# Patient Record
Sex: FEMALE | Race: WHITE | NOT HISPANIC OR LATINO | Employment: FULL TIME | URBAN - METROPOLITAN AREA
[De-identification: names, ages, dates, MRNs, and addresses within clinical notes are randomized per-mention and may not be internally consistent; named-entity substitution may affect disease eponyms.]

---
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
## 2021-05-13 NOTE — PROGRESS NOTES
Progress Note      Patient Name: Lesly Bonilla   Patient ID: [id number]   Sex: Female   YOB: 2000    Primary Care Provider: Yunior Gonsalez DO   Referring Provider: Yunior Gonsalez DO    Visit Date: August 11, 2020    Provider: Yunior Gonsalez DO   Location: [facility]   Location Address: [address] [place] [address], [address]  [place], [place]  [id number]   Location Phone: [phone]          History Of Present Illness  Lesly Bonilla is a 19 year old /White female who presents for evaluation and treatment of:      Pt is here for possible pink eye.    Pt states that it started Saturday with some discharge and feel like something was in her eye. Pt states that she woke up on Saturday with both eyes swollen shut.    Pt states that she has been regular eye drop R[facility] drops.       Allergy List  Allergen Name Date Reaction Notes   NO KNOWN DRUG ALLERGIES --  --  --        Allergies Reconciled  Social History  Finding Status Start/Stop Quantity Notes   Alcohol Never --/-- --  05/06/2020 -    Exercises daily --  --/-- --  --    Single --  --/-- --  --    Tobacco Never --/-- --  --          Review of Systems  · Constitutional  o Denies  o : fatigue, night sweats  · Eyes  o Admits  o : eye discomfort, eye pain, blurred vision, dry eyes  o Denies  o : double vision  · HENT  o Denies  o : vertigo, recent head injury  · Cardiovascular  o Denies  o : chest pain, irregular heart beats  · Respiratory  o Denies  o : shortness of breath, productive cough  · Gastrointestinal  o Denies  o : nausea, vomiting  · Genitourinary  o Denies  o : dysuria, urinary retention  · Integument  o Denies  o : hair growth change, new skin lesions  · Neurologic  o Denies  o : altered mental status, seizures  · Musculoskeletal  o Denies  o : joint swelling, limitation of motion  · Endocrine  o Denies  o : cold intolerance, heat intolerance  · Heme-Lymph  o Denies  o : petechiae, lymph node enlargement or  "tenderness  · Allergic-Immunologic  o Denies  o : frequent illnesses      Vitals  Date Time BP Position Site L\R Cuff Size HR RR TEMP (F) WT  HT  BMI kg/m2 BSA m2 O2 Sat HC       05/06/2020 08:30 /65 Sitting    84 - R   194lbs 16oz 4'  10\" 40.75 1.9 98 %    08/11/2020 03:58 /71 Sitting    107 - R   192lbs 4oz 4'  10\" 40.18 1.89 98 %          Physical Examination  · Constitutional  o Appearance  o : alert, oriented, in no acute distress, well developed, well-nourished, obese  · Eyes  o Vision  o : Conjuntivae:injected, Sclerae injected, Pupils: PERRL, Cornea: Clear, no lesions bilateral, no obvious abrasions despite significant injection of the eyes  · Ears, Nose, Mouth and Throat  o Ears  o : Ext. Ears: Normal shape, Non tender, EACs: Normal , Tragus intact bilaterally, Hearing: intact to conversational voice bilaterally  o Nose  o : No nasal discharge, Mucosa: normal, Septum: midline, Sinuses: Nontender  o Throat  o : Oropharynx: no inflmation or lesions, no purulence or drainage  · Neck  o Inspection/Palpation  o : Supple, no masses or tenderness, no deformities, Trachea: Midline, ROM: with in normal limits, no neck stiffness, no lymphadenopathy  o Thyroid  o : no thyomegaly, no palpabale masses   · Respiratory  o Auscultation of Lungs  o : normal breath sounds throughout, no wheeze, rhonchi, or crackles  · Cardiovascular  o Heart  o : Regular rate and rhythm, Normal S1,S2 , No cardiac murmers, No S3 or S4 gallop or rubs  · Gastrointestinal  o Abdominal Examination  o : abdomen soft, nontender, non distended, no rigidity, gaurding, rebound tenderness, no ventral hernias present  o Liver and spleen  o : no hepatomegaly present, liver nontender to palpation, spleen not palpable  · Skin and Subcutaneous Tissue  o General Inspection  o : no rashes on visible skin, normal skin color, warm and dry  o Digits and Nails  o : no clubbing, cyanosis, deformities or edema present, normal appearing " nails  · Neurologic  o Mental Status Examination  o : alert and oriented to time, place, and person. Gait and Station: normal gait, able to stand without difficulty. CN 2-12 grossly intact   · Psychiatric  o Judgment and Insight  o : judgment and insight intact  o Mood and Affect  o : normal mood and affect          Assessment  · Allergic rhinitis due to allergen     477.9/J30.9  · Acute bacterial conjunctivitis of both eyes     372.03/H10.33  Likely some sort of viral or bacterial infection however given how significantly injected will do autoimmune work up as well.    Problems Reconciled  Plan  · Orders  o ACO - Pt declines to or was not able to provide an Advance Care Plan or name a Surrogate Decision Maker (1124F) - - 08/11/2020  o CRP (92907) - 372.03/H10.33 - 08/11/2020  o Thyroid Profile (32572, 42225, THYII) - 372.03/H10.33 - 08/11/2020  o ACO-39: Current medications updated and reviewed () - - 08/11/2020  o ACO-14: Influenza immunization was not administered for reasons documented () - - 08/11/2020  o SS-A + SS-B ab (52308) - 372.03/H10.33 - 08/11/2020  o ESR (18968) - 372.03/H10.33 - 08/11/2020  o KIM (antinuclear antibody profile) by enzyme immunoassay (48674) - 372.03/H10.33 - 08/11/2020  · Medications  o ofloxacin 0.3 % ophthalmic (eye) drops   SIG: instill 1 drop into affected eye(s) by ophthalmic route 4 times per day   DISP: (1) 5 ml drop btl with 1 refills  Prescribed on 08/11/2020     o cetirizine 10 mg oral tablet   SIG: take 1 tablet (10 mg) by oral route once daily   DISP: (30) tablets with 2 refills  Prescribed on 08/11/2020     o Medications have been Reconciled  o Transition of Care or Provider Policy  · Instructions  o Take all medications as prescribed/directed.  o Patient was educated/instructed on their diagnosis, treatment and medications prior to discharge from the clinic today.  o Patient was instructed to exercise regularly.  o Patient instructed to seek medical attention  urgently for new or worsening symptoms.  o Bring all medicines with their bottles to each office visit.  o Minutes spent with patient including greater than 50% in Education/Counseling/Care Coordination.  o Time spent with the patient was minutes, more than 50% face to face.  o Chronic conditions reviewed and taken into consideration for today's treatment plan.  o Discussed Covid-19 precautions including, but not limited to, social distancing, avoid touching your face, and hand washing.   o Electronically Identified Patient Education Materials Provided Electronically  · Disposition  o Call or Return if symptoms worsen or persist.            Electronically Signed by: Yunior Gonsalez, DO -Author on August 25, 2020 01:59:36 AM

## 2021-05-13 NOTE — PROGRESS NOTES
Progress Note      Patient Name: Lesly Bonilla   Patient ID: [id number]   Sex: Female   YOB: 2000    Primary Care Provider: Yunior Gonsalez DO   Referring Provider: Yunior Gonsalez DO    Visit Date: May 6, 2020    Provider: Yunior Gonsalez DO   Location: [facility] beds at [facility]   Location Address: [address] [place] [address], [address]  [place], [place]  [id number]   Location Phone: [phone]          Chief Complaint  · NEW PATIENT ESTABLISH CARE      History Of Present Illness  Lesly Bonilla is a 19 year old /White female who presents for evaluation and treatment of:      Patient is here today to establish care. Patient with no problems to report currently. Mother is concerned about birth control situation as patient is getting ready to go to college. Patient has not had a PAP smear either yet. Patient going to OBGYN to discuss possible IUD.  Patient with a few plantar warts on her feet but they do not bother her and she does not want to address them currently.     Patient going to [employer] in the fall to start college.       Allergy List  Allergen Name Date Reaction Notes   NO KNOWN DRUG ALLERGIES --  --  --        Allergies Reconciled  Social History  Finding Status Start/Stop Quantity Notes   Alcohol Never --/-- --  05/06/2020 -    Exercises daily --  --/-- --  --    Single --  --/-- --  --    Tobacco Never --/-- --  --          Review of Systems  · Constitutional  o Denies  o : fatigue, night sweats  · Eyes  o Denies  o : double vision, blurred vision  · HENT  o Denies  o : vertigo, recent head injury  · Breasts  o Denies  o : abnormal changes in breast size, additional breast symptoms except as noted in the HPI  · Cardiovascular  o Denies  o : chest pain, irregular heart beats  · Respiratory  o Denies  o : shortness of breath, productive cough  · Gastrointestinal  o Denies  o : nausea, vomiting  · Genitourinary  o Denies  o : dysuria, urinary retention  · Integument  o Denies  o : hair growth  "change, new skin lesions  · Neurologic  o Denies  o : altered mental status, seizures  · Musculoskeletal  o Denies  o : joint swelling, limitation of motion  · Endocrine  o Denies  o : cold intolerance, heat intolerance  · Psychiatric  o Denies  o : anxiety, depression  · Heme-Lymph  o Denies  o : petechiae, lymph node enlargement or tenderness  · Allergic-Immunologic  o Denies  o : frequent illnesses      Vitals  Date Time BP Position Site L\R Cuff Size HR RR TEMP (F) WT  HT  BMI kg/m2 BSA m2 O2 Sat        05/06/2020 08:30 /65 Sitting    84 - R   194lbs 16oz 4'  10\" 40.75 1.9 98 %          Physical Examination  · Constitutional  o Appearance  o : alert, oriented, in no acute distress, well developed, well-nourished, morbid obesity  · Eyes  o Vision  o : Conjuntivae: Normal, Sclerae white, Pupils: PERRL, Cornea: Clear, no lesions bilateral  · Ears, Nose, Mouth and Throat  o Ears  o : Ext. Ears: Normal shape, Non tender, EACs: Normal , Tragus intact bilaterally, Hearing: intact to conversational voice bilaterally  o Nose  o : No nasal discharge, Mucosa: normal, Septum: midline, Sinuses: Nontender  o Throat  o : Oropharynx: no inflammation or lesions, no purulence or drainage  · Neck  o Inspection/Palpation  o : Supple, no masses or tenderness, no deformities, Trachea: Midline, ROM: with in normal limits, no neck stiffness, no lymphadenopathy  o Thyroid  o : no thyromegaly, no palpabale masses  · Respiratory  o Auscultation of Lungs  o : normal breath sounds throughout, no wheeze, rhonchi, or crackles  · Cardiovascular  o Heart  o : Regular rate and rhythm, Normal S1,S2 , No cardiac murmers, No S3 or S4 gallop or rubs  · Gastrointestinal  o Abdominal Examination  o : abdomen soft, nontender, non distended, no rigidity, guarding, rebound tenderness, no ventral hernias present  o Liver and spleen  o : no hepatomegaly present, liver nontender to palpation, spleen not palpable  · Musculoskeletal  o General  o : "   § General Musculoskeletal  § : No joint swelling or deformity., Muscle tone, strength, and development grossly normal.  · Skin and Subcutaneous Tissue  o General Inspection  o : no rashes on visible skin, normal skin color, warm and dry  o Digits and Nails  o : no clubbing, cyanosis, deformities or edema present, normal appearing nails  · Neurologic  o Mental Status Examination  o : alert and oriented to time, place, and person. Gait and Station: normal gait, able to stand without difficulty. CN 2-12 grossly intact   · Psychiatric  o Judgement and Insight  o : judgment and insight intact  o Mood and Affect  o : normal mood and affect          Assessment  · Screening for depression     V79.0/Z13.89  · Screening for lipid disorders     V77.91/Z13.220  · Screening for cervical cancer     V76.2/Z12.4  · Establishing care with new doctor, encounter for     V65.8/Z76.89  Check basic labs today.  · Factor V Leiden     289.81/D68.51  Given her mother having history, do need to check Factor V gene mutation.  · Routine lab draw     V72.60/Z01.89  · Morbid obesity     278.01/E66.01  Discussed importance of diet, exercise, and weight loss to help prevent medical problems as she gets older.      Plan  · Orders  o CBC with Auto Diff [facility] (00297) - V65.8/Z76.89 - 05/06/2020  o CMP [facility] (17345) - V65.8/Z76.89, V72.60/Z01.89 - 05/06/2020  o Lipid Panel [facility] (07089) - V77.91/Z13.220 - 05/06/2020  o ACO-39: Current medications updated and reviewed () - - 05/06/2020  o ACO-18: Negative screen for clinical depression using a standardized tool () - - 05/06/2020  o ACO-14: Influenza immunization was not administered for reasons documented () - - 05/06/2020  o ACO-13: Fall Risk Screening with no falls in past year or only one fall without injury in the past year (1101F) - - 05/06/2020  o ACO - Pt declines to or was not able to provide an Advance Care Plan or name a Surrogate Decision Maker (1124F) - - 05/06/2020  o Factor V  gene mutation analysis (53681) - 289.81/D68.51 - 05/06/2020  o OB/GYN CONSULTATION (OBGYN) - V76.2/Z12.4 - 05/06/2020  · Medications  o Medications have been Reconciled  o Transition of Care or Provider Policy  · Instructions  o Depression Screen completed and scanned into the EMR under the designated folder within the patient's documents.  o Today's PHQ-9 result is 2.  o Take all medications as prescribed/directed.  o Patient was educated/instructed on their diagnosis, treatment and medications prior to discharge from the clinic today.  o Patient counseled to reduce calorie intake.  o Patient was instructed to exercise regularly.  o Patient instructed to seek medical attention urgently for new or worsening symptoms.  o Call the office with any concerns or questions.  o Minutes spent with patient including greater than 50% in Education/Counseling/Care Coordination.  o Time spent with the patient was minutes, more than 50% face to face.  o Chronic conditions reviewed and taken into consideration for today's treatment plan.  o Discussed Covid-19 precautions including, but not limited to, social distancing, avoid touching your face, and hand washing.   · Disposition  o Follow up in one year.            Electronically Signed by: Yunior Gonsalez DO -Author on May 11, 2020 11:45:24 PM

## 2021-05-15 VITALS
OXYGEN SATURATION: 98 % | DIASTOLIC BLOOD PRESSURE: 71 MMHG | SYSTOLIC BLOOD PRESSURE: 122 MMHG | HEIGHT: 58 IN | HEART RATE: 107 BPM | WEIGHT: 192.25 LBS | BODY MASS INDEX: 40.35 KG/M2

## 2021-05-15 VITALS
BODY MASS INDEX: 40.93 KG/M2 | HEART RATE: 84 BPM | HEIGHT: 58 IN | WEIGHT: 195 LBS | OXYGEN SATURATION: 98 % | DIASTOLIC BLOOD PRESSURE: 65 MMHG | SYSTOLIC BLOOD PRESSURE: 126 MMHG

## 2022-04-14 ENCOUNTER — HOSPITAL ENCOUNTER (OUTPATIENT)
Dept: GENERAL RADIOLOGY | Facility: HOSPITAL | Age: 22
Discharge: HOME | End: 2022-04-14
Admitting: NURSE PRACTITIONER

## 2022-04-14 ENCOUNTER — OFFICE VISIT (OUTPATIENT)
Dept: FAMILY MEDICINE CLINIC | Facility: CLINIC | Age: 22
End: 2022-04-14

## 2022-04-14 VITALS
HEIGHT: 58 IN | BODY MASS INDEX: 39.55 KG/M2 | TEMPERATURE: 97.9 F | SYSTOLIC BLOOD PRESSURE: 127 MMHG | HEART RATE: 95 BPM | DIASTOLIC BLOOD PRESSURE: 75 MMHG | OXYGEN SATURATION: 99 % | WEIGHT: 188.4 LBS

## 2022-04-14 DIAGNOSIS — R51.9 CHRONIC NONINTRACTABLE HEADACHE, UNSPECIFIED HEADACHE TYPE: ICD-10-CM

## 2022-04-14 DIAGNOSIS — G89.29 CHRONIC NONINTRACTABLE HEADACHE, UNSPECIFIED HEADACHE TYPE: ICD-10-CM

## 2022-04-14 DIAGNOSIS — M25.562 CHRONIC PAIN OF LEFT KNEE: Primary | ICD-10-CM

## 2022-04-14 DIAGNOSIS — G89.29 CHRONIC PAIN OF LEFT KNEE: Primary | ICD-10-CM

## 2022-04-14 DIAGNOSIS — M25.562 CHRONIC PAIN OF LEFT KNEE: ICD-10-CM

## 2022-04-14 DIAGNOSIS — Z01.89 ROUTINE LAB DRAW: ICD-10-CM

## 2022-04-14 DIAGNOSIS — G89.29 CHRONIC PAIN OF LEFT KNEE: ICD-10-CM

## 2022-04-14 PROCEDURE — 73562 X-RAY EXAM OF KNEE 3: CPT

## 2022-04-14 PROCEDURE — 99203 OFFICE O/P NEW LOW 30 MIN: CPT | Performed by: NURSE PRACTITIONER

## 2022-04-14 RX ORDER — ACETAMINOPHEN 500 MG
500 TABLET ORAL EVERY 6 HOURS PRN
COMMUNITY

## 2022-04-14 RX ORDER — IBUPROFEN 200 MG
200 TABLET ORAL EVERY 6 HOURS PRN
COMMUNITY

## 2022-04-14 NOTE — PROGRESS NOTES
"Chief Complaint  Establish Care and Headache    Subjective          Lesly Bonilla presents to [facility]  History of Present Illness     New patient she is here to establish care.     Chronic headaches x 2 years. States they have gotten a little worse. Denies light sensitivity, nausea or vomiting. States she alternates tylenol or ibuprofen. Reports they normally last 30min-1 hr. She has kept a headache diary. Reports her headaches normally occur around her period. Reports her are normally around 6 days. Mom states at least twice a month she has a bad headache.      Knee pain left-she had a fall June or July of last yr. Denies swelling after the injury. Reports walking aggravates her knee pain. She did have swelling of her shin but it has resolved.     She  has no past medical history on file.     Objective   Vital Signs:   /75   Pulse 95   Temp 97.9 °F (36.6 °C) (Oral)   Ht 147.3 cm (58\")   Wt 85.5 kg (188 lb 6.4 oz)   SpO2 99%   BMI 39.38 kg/m²     Physical Exam  Constitutional:       Appearance: Normal appearance.   Neck:      Thyroid: No thyroid mass, thyromegaly or thyroid tenderness.      Vascular: No carotid bruit.   Cardiovascular:      Rate and Rhythm: Normal rate and regular rhythm.      Pulses: Normal pulses.      Heart sounds: Normal heart sounds.   Pulmonary:      Effort: Pulmonary effort is normal.      Breath sounds: Normal breath sounds.   Musculoskeletal:      Left knee: Normal.      Right lower leg: No edema.      Left lower leg: No edema.   Skin:     General: Skin is warm and dry.   Neurological:      General: No focal deficit present.      Mental Status: She is alert and oriented to person, place, and time.   Psychiatric:         Mood and Affect: Mood normal.         Behavior: Behavior normal.        Result Review :        History reviewed. No pertinent surgical history.   Family History   Problem Relation Age of Onset   • Cancer Mother    • Stroke " Mother    • Clotting disorder Mother    • Cancer Father    • Diverticulitis Father    • Thyroid disease Maternal Grandmother    • Hypertension Maternal Grandmother         Current Outpatient Medications:   •  acetaminophen (TYLENOL) 500 MG tablet, Take 500 mg by mouth Every 6 (Six) Hours As Needed for Mild Pain ., Disp: , Rfl:   •  ibuprofen (ADVIL,MOTRIN) 200 MG tablet, Take 200 mg by mouth Every 6 (Six) Hours As Needed for Mild Pain ., Disp: , Rfl:    Assessment and Plan    Diagnoses and all orders for this visit:    1. Chronic pain of left knee (Primary)  Comments:  admits left knee injury 1 yr ago which has never fully resolved-ordered left knee xray  Orders:  -     XR Knee 3 View Left; Future    2. Routine lab draw  -     CBC & Differential; Future  -     Vitamin B12; Future  -     Lipid Panel; Future  -     TSH; Future  -     Vitamin D 25 Hydroxy; Future  -     Comprehensive Metabolic Panel; Future  -     Urinalysis With Culture If Indicated -; Future    3. Chronic nonintractable headache, unspecified headache type  Comments:  admits chronic h/a x 2 yrs. Denies injury. They seem to be corrrelated to hormone levels as they increase around menses. Gave pt samples of Nurtec.         Follow Up   Return in about 2 weeks (around 4/28/2022).  Patient was given instructions and counseling regarding her condition or for health maintenance advice. Please see specific information pulled into the AVS if appropriate.     Lesly Bonilla  reports that she has never smoked. She has never used smokeless tobacco.            Diann Rayo, MARIJA      Answers for HPI/ROS submitted by the patient on 4/7/2022  Please describe your symptoms.: Papsmear. However, i my be on period so i may change it to pain in my left knee or lots of headaches.  Have you had these symptoms before?: Yes  How long have you been having these symptoms?: Greater than 2 weeks  Please list any medications you are currently taking for this condition.:  Ibuprofen  Please describe any probable cause for these symptoms. : For the knee, i fell in june/july of 2021 and hit my shin really bad and it still has a bump on it or it could be from continuous walking at my job. Headaches, i have no clue.  What is the primary reason for your visit?: Other

## 2022-04-15 ENCOUNTER — TELEPHONE (OUTPATIENT)
Dept: FAMILY MEDICINE CLINIC | Facility: CLINIC | Age: 22
End: 2022-04-15

## 2022-04-16 ENCOUNTER — LAB (OUTPATIENT)
Dept: LAB | Facility: HOSPITAL | Age: 22
End: 2022-04-16

## 2022-04-16 DIAGNOSIS — Z01.89 ROUTINE LAB DRAW: ICD-10-CM

## 2022-04-16 LAB
25(OH)D3 SERPL-MCNC: 15.6 NG/ML (ref 30–100)
ALBUMIN SERPL-MCNC: 4.6 G/DL (ref 3.5–5.2)
ALBUMIN/GLOB SERPL: 1.3 G/DL
ALP SERPL-CCNC: 113 U/L (ref 39–117)
ALT SERPL W P-5'-P-CCNC: 17 U/L (ref 1–33)
ANION GAP SERPL CALCULATED.3IONS-SCNC: 11 MMOL/L (ref 5–15)
AST SERPL-CCNC: 17 U/L (ref 1–32)
BACTERIA UR QL AUTO: ABNORMAL /HPF
BASOPHILS # BLD AUTO: 0.03 10*3/MM3 (ref 0–0.2)
BASOPHILS NFR BLD AUTO: 0.3 % (ref 0–1.5)
BILIRUB SERPL-MCNC: 0.2 MG/DL (ref 0–1.2)
BILIRUB UR QL STRIP: NEGATIVE
BUN SERPL-MCNC: 13 MG/DL (ref 6–20)
BUN/CREAT SERPL: 19.4 (ref 7–25)
CALCIUM SPEC-SCNC: 9.3 MG/DL (ref 8.6–10.5)
CHLORIDE SERPL-SCNC: 103 MMOL/L (ref 98–107)
CHOLEST SERPL-MCNC: 155 MG/DL (ref 0–200)
CLARITY UR: CLEAR
CO2 SERPL-SCNC: 24 MMOL/L (ref 22–29)
COLOR UR: YELLOW
CREAT SERPL-MCNC: 0.67 MG/DL (ref 0.57–1)
DEPRECATED RDW RBC AUTO: 39.6 FL (ref 37–54)
EGFRCR SERPLBLD CKD-EPI 2021: 128.5 ML/MIN/1.73
EOSINOPHIL # BLD AUTO: 0.06 10*3/MM3 (ref 0–0.4)
EOSINOPHIL NFR BLD AUTO: 0.7 % (ref 0.3–6.2)
ERYTHROCYTE [DISTWIDTH] IN BLOOD BY AUTOMATED COUNT: 12.8 % (ref 12.3–15.4)
GLOBULIN UR ELPH-MCNC: 3.5 GM/DL
GLUCOSE SERPL-MCNC: 81 MG/DL (ref 65–99)
GLUCOSE UR STRIP-MCNC: NEGATIVE MG/DL
HCT VFR BLD AUTO: 41.8 % (ref 34–46.6)
HDLC SERPL-MCNC: 55 MG/DL (ref 40–60)
HGB BLD-MCNC: 13.8 G/DL (ref 12–15.9)
HGB UR QL STRIP.AUTO: ABNORMAL
HYALINE CASTS UR QL AUTO: ABNORMAL /LPF
IMM GRANULOCYTES # BLD AUTO: 0.02 10*3/MM3 (ref 0–0.05)
IMM GRANULOCYTES NFR BLD AUTO: 0.2 % (ref 0–0.5)
KETONES UR QL STRIP: NEGATIVE
LDLC SERPL CALC-MCNC: 89 MG/DL (ref 0–100)
LDLC/HDLC SERPL: 1.63 {RATIO}
LEUKOCYTE ESTERASE UR QL STRIP.AUTO: NEGATIVE
LYMPHOCYTES # BLD AUTO: 2.34 10*3/MM3 (ref 0.7–3.1)
LYMPHOCYTES NFR BLD AUTO: 27.1 % (ref 19.6–45.3)
MCH RBC QN AUTO: 27.9 PG (ref 26.6–33)
MCHC RBC AUTO-ENTMCNC: 33 G/DL (ref 31.5–35.7)
MCV RBC AUTO: 84.4 FL (ref 79–97)
MONOCYTES # BLD AUTO: 0.44 10*3/MM3 (ref 0.1–0.9)
MONOCYTES NFR BLD AUTO: 5.1 % (ref 5–12)
NEUTROPHILS NFR BLD AUTO: 5.75 10*3/MM3 (ref 1.7–7)
NEUTROPHILS NFR BLD AUTO: 66.6 % (ref 42.7–76)
NITRITE UR QL STRIP: NEGATIVE
NRBC BLD AUTO-RTO: 0 /100 WBC (ref 0–0.2)
PH UR STRIP.AUTO: 6 [PH] (ref 5–8)
PLATELET # BLD AUTO: 345 10*3/MM3 (ref 140–450)
PMV BLD AUTO: 10.9 FL (ref 6–12)
POTASSIUM SERPL-SCNC: 4 MMOL/L (ref 3.5–5.2)
PROT SERPL-MCNC: 8.1 G/DL (ref 6–8.5)
PROT UR QL STRIP: NEGATIVE
RBC # BLD AUTO: 4.95 10*6/MM3 (ref 3.77–5.28)
RBC # UR STRIP: ABNORMAL /HPF
REF LAB TEST METHOD: ABNORMAL
SODIUM SERPL-SCNC: 138 MMOL/L (ref 136–145)
SP GR UR STRIP: 1.02 (ref 1–1.03)
SQUAMOUS #/AREA URNS HPF: ABNORMAL /HPF
TRIGL SERPL-MCNC: 52 MG/DL (ref 0–150)
TSH SERPL DL<=0.05 MIU/L-ACNC: 2.65 UIU/ML (ref 0.27–4.2)
UROBILINOGEN UR QL STRIP: ABNORMAL
VIT B12 BLD-MCNC: 754 PG/ML (ref 211–946)
VLDLC SERPL-MCNC: 11 MG/DL (ref 5–40)
WBC # UR STRIP: ABNORMAL /HPF
WBC NRBC COR # BLD: 8.64 10*3/MM3 (ref 3.4–10.8)

## 2022-04-16 PROCEDURE — 81001 URINALYSIS AUTO W/SCOPE: CPT

## 2022-04-16 PROCEDURE — 80061 LIPID PANEL: CPT

## 2022-04-16 PROCEDURE — 36415 COLL VENOUS BLD VENIPUNCTURE: CPT

## 2022-04-16 PROCEDURE — 80050 GENERAL HEALTH PANEL: CPT

## 2022-04-16 PROCEDURE — 82306 VITAMIN D 25 HYDROXY: CPT

## 2022-04-16 PROCEDURE — 82607 VITAMIN B-12: CPT

## 2022-04-20 ENCOUNTER — TELEPHONE (OUTPATIENT)
Dept: FAMILY MEDICINE CLINIC | Facility: CLINIC | Age: 22
End: 2022-04-20

## 2022-04-20 DIAGNOSIS — E55.9 VITAMIN D DEFICIENCY: Primary | ICD-10-CM

## 2022-04-20 RX ORDER — ERGOCALCIFEROL 1.25 MG/1
50000 CAPSULE ORAL WEEKLY
Qty: 12 CAPSULE | Refills: 0 | Status: SHIPPED | OUTPATIENT
Start: 2022-04-20 | End: 2023-01-27

## 2022-04-20 NOTE — TELEPHONE ENCOUNTER
----- Message from MARIJA Arce sent at 4/18/2022  4:42 PM EDT -----  Trace blood noted on urine-was she on her menses? Vit b12 , tsh, lipid, CMP, CBC WNL. Vit D low recommend starting vit D 50,000 units once wk x 12 wks then recheck in 12 wks.

## 2022-04-28 ENCOUNTER — OFFICE VISIT (OUTPATIENT)
Dept: FAMILY MEDICINE CLINIC | Facility: CLINIC | Age: 22
End: 2022-04-28

## 2022-04-28 ENCOUNTER — LAB (OUTPATIENT)
Dept: LAB | Facility: HOSPITAL | Age: 22
End: 2022-04-28

## 2022-04-28 VITALS
HEART RATE: 104 BPM | HEIGHT: 58 IN | WEIGHT: 188 LBS | OXYGEN SATURATION: 99 % | DIASTOLIC BLOOD PRESSURE: 66 MMHG | BODY MASS INDEX: 39.47 KG/M2 | SYSTOLIC BLOOD PRESSURE: 112 MMHG

## 2022-04-28 DIAGNOSIS — Z11.3 SCREENING FOR STD (SEXUALLY TRANSMITTED DISEASE): ICD-10-CM

## 2022-04-28 DIAGNOSIS — Z23 NEED FOR HPV VACCINATION: ICD-10-CM

## 2022-04-28 DIAGNOSIS — Z12.4 SCREENING FOR CERVICAL CANCER: Primary | ICD-10-CM

## 2022-04-28 DIAGNOSIS — Z01.419 ENCOUNTER FOR GYNECOLOGICAL EXAMINATION: ICD-10-CM

## 2022-04-28 LAB
HCV AB SER DONR QL: NORMAL
HIV1+2 AB SER QL: NORMAL

## 2022-04-28 PROCEDURE — 87480 CANDIDA DNA DIR PROBE: CPT | Performed by: NURSE PRACTITIONER

## 2022-04-28 PROCEDURE — 36415 COLL VENOUS BLD VENIPUNCTURE: CPT

## 2022-04-28 PROCEDURE — 87529 HSV DNA AMP PROBE: CPT | Performed by: NURSE PRACTITIONER

## 2022-04-28 PROCEDURE — G0123 SCREEN CERV/VAG THIN LAYER: HCPCS | Performed by: NURSE PRACTITIONER

## 2022-04-28 PROCEDURE — 90471 IMMUNIZATION ADMIN: CPT | Performed by: NURSE PRACTITIONER

## 2022-04-28 PROCEDURE — 87660 TRICHOMONAS VAGIN DIR PROBE: CPT | Performed by: NURSE PRACTITIONER

## 2022-04-28 PROCEDURE — 87510 GARDNER VAG DNA DIR PROBE: CPT | Performed by: NURSE PRACTITIONER

## 2022-04-28 PROCEDURE — 86696 HERPES SIMPLEX TYPE 2 TEST: CPT

## 2022-04-28 PROCEDURE — 99395 PREV VISIT EST AGE 18-39: CPT | Performed by: NURSE PRACTITIONER

## 2022-04-28 PROCEDURE — G0432 EIA HIV-1/HIV-2 SCREEN: HCPCS | Performed by: NURSE PRACTITIONER

## 2022-04-28 PROCEDURE — 86803 HEPATITIS C AB TEST: CPT

## 2022-04-28 PROCEDURE — 86695 HERPES SIMPLEX TYPE 1 TEST: CPT

## 2022-04-28 PROCEDURE — 87491 CHLMYD TRACH DNA AMP PROBE: CPT

## 2022-04-28 PROCEDURE — 87624 HPV HI-RISK TYP POOLED RSLT: CPT | Performed by: NURSE PRACTITIONER

## 2022-04-28 PROCEDURE — 87591 N.GONORRHOEAE DNA AMP PROB: CPT

## 2022-04-28 PROCEDURE — 90651 9VHPV VACCINE 2/3 DOSE IM: CPT | Performed by: NURSE PRACTITIONER

## 2022-04-28 NOTE — PROGRESS NOTES
"Chief Complaint  Gynecologic Exam and Annual Exam    Subjective     {Problem List  Visit Diagnosis   Encounters  Notes  Medications  Labs  Result Review Imaging  Media :23}     Lesly Bonilla presents to [facility]  History of Present Illness      Patient Care Team:  Diann Rayo APRN as PCP - General (Nurse Practitioner)   She  has no past medical history on file.     Objective   Vital Signs:   Ht 147.3 cm (58\")   BMI 39.38 kg/m²     Physical Exam   Result Review :{Labs  Result Review  Imaging  Med Tab  Media  Procedures :23}   {The following data was reviewed by (Optional):26051}  {Ambulatory Labs (Optional):96253}  {Data reviewed (Optional):57448:::1}          No past surgical history on file.   Family History   Problem Relation Age of Onset   • Cancer Mother    • Stroke Mother    • Clotting disorder Mother    • Cancer Father    • Diverticulitis Father    • Thyroid disease Maternal Grandmother    • Hypertension Maternal Grandmother         Current Outpatient Medications:   •  acetaminophen (TYLENOL) 500 MG tablet, Take 500 mg by mouth Every 6 (Six) Hours As Needed for Mild Pain ., Disp: , Rfl:   •  ibuprofen (ADVIL,MOTRIN) 200 MG tablet, Take 200 mg by mouth Every 6 (Six) Hours As Needed for Mild Pain ., Disp: , Rfl:   •  vitamin D (ERGOCALCIFEROL) 1.25 MG (49518 UT) capsule capsule, Take 1 capsule by mouth 1 (One) Time Per Week., Disp: 12 capsule, Rfl: 0   Assessment and Plan {CC Problem List  Visit Diagnosis   ROS  Review (Popup)  Health Maintenance  Quality  BestPractice  Medications  SmartSets  SnapShot Encounters  Media :23}   Diagnoses and all orders for this visit:    1. Screening for cervical cancer (Primary)    2. Encounter for gynecological examination      {Time Spent (Optional):98618}  Follow Up {Instructions Charge Capture  Follow-up Communications :23}  No follow-ups on file.  Patient was given instructions and counseling " "regarding her condition or for health maintenance advice. Please see specific information pulled into the AVS if appropriate.     Lesly Bonilla  reports that she has never smoked. She has never used smokeless tobacco.. I have educated her on the risk of diseases from using tobacco products such as {Tobacco Cessation Diseases:58116::\"cancer\",\"COPD\",\"heart disease\"}.     I advised her to quit and she is {Willing/Not Willing to Quit Tobacco Products:42767}.    I spent {Time Spent Tobacco :61847} minutes counseling the patient.              Farideh Brian MA    The patient is advised to {lifestyle advice:048937}.  "

## 2022-04-28 NOTE — PROGRESS NOTES
"Subjective   History of Present Illness    Lesly Bonilla is a 21 y.o. female who presents for annual exam with Pap smear and STD screening. She is accompanied by her mother today.     The patient reports bumps on her vagina. She denies that they are painful to palpation.     The patient reports occasional vaginal discharge that resolves on its own. She denies vulvar pruritus. She has not received the HPV vaccines.     Obstetric History:  OB History    No obstetric history on file.        Menstrual History:  Menarche Age: 14 years  Patient's last menstrual period was 04/14/2022.  Period Cycle (Days): 28  Period Duration (Days): 6  Period Pattern: Regular  Menstrual Flow: Moderate  Menstrual Control: Maxi pad    Sexual History: pt not sexually active nor has she even been-she is requesting to be screened for STD's so if she ever is sexually active she can report clean bill of health.   She is not yet 22 but would like to go ahead and have a pap since she is being screened for STDs.         Current contraception: none  History of abnormal Pap smear: no  ROLANDO exposure in utero: no  Received Gardasil immunization: no  Perform regular self breast exam: no  Family history of uterine or ovarian cancer: no  Family History of cervical cancer: no  Family History of colon cancer/colon polyps: yes - Grandfather/Polyps  Regular self breast exam: no  History of abnormal mammogram: no  Family history of breast cancer: no  History of abnormal lipids: no    The following portions of the patient's history were reviewed and updated as appropriate: allergies, current medications, past family history, past medical history, past social history, past surgical history and problem list.    Review of Systems    A comprehensive review of systems was negative.     Objective   Physical Exam    /66   Pulse 104   Ht 147.3 cm (58\")   Wt 85.3 kg (188 lb)   LMP 04/14/2022   SpO2 99%   BMI 39.29 kg/m²     General:   alert, appears stated " age and cooperative   Heart: regular rate and rhythm, S1, S2 normal, no murmur, click, rub or gallop   Lungs: clear to auscultation bilaterally   Abdomen: soft, non-tender, without masses or organomegaly   Breast: inspection negative, no nipple discharge or bleeding, no masses or nodularity palpable   Vulva: normal   Vagina: large amount of  thin clear discharge noted   Cervix: no cervical motion tenderness   Uterus: normal size   Adnexa: normal adnexa   Sebaceous cyst noted on exam  Assessment/Plan   Diagnoses and all orders for this visit:    1. Screening for cervical cancer (Primary)  Comments:  Pap smear was performed today  Orders:  -     IgP, Aptima HPV; Future  -     IgP, Aptima HPV  -     Gardnerella vaginalis, Trichomonas vaginalis, Candida albicans, DNA - Swab, Vagina; Future  -     Gardnerella vaginalis, Trichomonas vaginalis, Candida albicans, DNA - Swab, Vagina    2. Encounter for gynecological examination  Comments:  Pap smear and breast exam was performed today  Orders:  -     IgP, Aptima HPV; Future  -     IgP, Aptima HPV  -     Gardnerella vaginalis, Trichomonas vaginalis, Candida albicans, DNA - Swab, Vagina; Future  -     Gardnerella vaginalis, Trichomonas vaginalis, Candida albicans, DNA - Swab, Vagina    3. Screening for STD (sexually transmitted disease)  Comments:  Pap smear was performed. We will obtain labs today as well.   Orders:  -     Bacterial Vaginosis, JEB - Swab, Vagina; Future  -     Candida panel, PCR - Swab, Vagina; Future  -     HIV-1 / O / 2 Ag / Antibody 4th Generation  -     HSV 1 & 2 - Specific Antibody, IgG; Future  -     Chlamydia trachomatis, Neisseria gonorrhoeae, Trichomonas vaginalis, PCR - Swab, Vagina; Future  -     HSV 1/2, PCR (Non-CSF) - Swab, Cervix; Future  -     Hepatitis C antibody; Future  -     Cancel: Bacterial Vaginosis, JEB - Swab, Vagina  -     Cancel: Chlamydia trachomatis, Neisseria gonorrhoeae, Trichomonas vaginalis, PCR - Swab, Vagina  -     Cancel:  Candida panel, PCR - Swab, Vagina  -     HSV 1/2, PCR (Non-CSF) - Swab, Cervix  -     Gardnerella vaginalis, Trichomonas vaginalis, Candida albicans, DNA - Swab, Vagina; Future  -     Gardnerella vaginalis, Trichomonas vaginalis, Candida albicans, DNA - Swab, Vagina    4. Need for HPV vaccination  Comments:  Patient is agreeable to the HPV vaccine today.   Orders:  -     HPV 9-Valent Recomb Vaccine suspension 0.5 mL  -     Gardnerella vaginalis, Trichomonas vaginalis, Candida albicans, DNA - Swab, Vagina; Future  -     Gardnerella vaginalis, Trichomonas vaginalis, Candida albicans, DNA - Swab, Vagina        All questions answered.  Await pap smear results.  Breast self exam technique reviewed and patient encouraged to perform self-exam monthly.  Thin prep Pap smear.           Transcribed from ambient dictation for MARIJA Arce by Sydnie Lewis.  04/28/22   17:19 EDT    Patient verbalized consent to the visit recording.

## 2022-04-29 ENCOUNTER — TELEPHONE (OUTPATIENT)
Dept: FAMILY MEDICINE CLINIC | Facility: CLINIC | Age: 22
End: 2022-04-29

## 2022-04-29 LAB
C TRACH RRNA CVX QL NAA+PROBE: NOT DETECTED
CANDIDA SPECIES: NEGATIVE
GARDNERELLA VAGINALIS: NEGATIVE
HSV1 DNA SPEC QL NAA+PROBE: NOT DETECTED
HSV1 IGG SER IA-ACNC: 29.8 INDEX (ref 0–0.9)
HSV2 DNA SPEC QL NAA+PROBE: NOT DETECTED
HSV2 IGG SER IA-ACNC: <0.91 INDEX (ref 0–0.9)
N GONORRHOEA RRNA SPEC QL NAA+PROBE: NOT DETECTED
T VAGINALIS DNA VAG QL PROBE+SIG AMP: NEGATIVE

## 2022-04-29 NOTE — TELEPHONE ENCOUNTER
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 HSV 2 neg, neg for Chlamydia, gonorrhoeae

## 2022-04-29 NOTE — TELEPHONE ENCOUNTER
----- Message from Lesly Bonilla sent at 4/29/2022 11:35 AM EDT -----  Regarding: Question regarding HSV 1 AND 2-SPECIFIC AB, IGG  Please give more info. I think it’s herpes simplex virus.  Is the 29 too high? Is it harmful? Thank you so much for your time and have a great day.

## 2022-05-06 LAB
CYTOLOGIST CVX/VAG CYTO: NORMAL
CYTOLOGY CVX/VAG DOC CYTO: NORMAL
CYTOLOGY CVX/VAG DOC THIN PREP: NORMAL
DX ICD CODE: NORMAL
HIV 1 & 2 AB SER-IMP: NORMAL
HPV I/H RISK 4 DNA CVX QL PROBE+SIG AMP: NEGATIVE
Lab: NORMAL
OTHER STN SPEC: NORMAL
STAT OF ADQ CVX/VAG CYTO-IMP: NORMAL

## 2022-05-10 ENCOUNTER — TELEPHONE (OUTPATIENT)
Dept: FAMILY MEDICINE CLINIC | Facility: CLINIC | Age: 22
End: 2022-05-10

## 2022-05-10 NOTE — TELEPHONE ENCOUNTER
Caller: DILMA    Relationship to patient: Mother    Best call back number: [phone]    Patient is needing: PATIENT'S MOTHER CALLED IN AND WOULD LIKE A CALL BACK REGARDING PATIENT'S TWO BUMPS SEEN AT LAST VISIT. MOTHER SAID IT IS OKAY TO LEAVE MESSAGE WITH ALFRED LYN AS WELL PLEASE CALL AND ADVISE.

## 2022-12-15 ENCOUNTER — CLINICAL SUPPORT (OUTPATIENT)
Dept: FAMILY MEDICINE CLINIC | Facility: CLINIC | Age: 22
End: 2022-12-15

## 2022-12-15 DIAGNOSIS — B00.1 RECURRENT COLD SORES: Primary | ICD-10-CM

## 2022-12-15 DIAGNOSIS — Z23 NEED FOR HPV VACCINATION: Primary | ICD-10-CM

## 2022-12-15 PROCEDURE — 90471 IMMUNIZATION ADMIN: CPT | Performed by: NURSE PRACTITIONER

## 2022-12-15 PROCEDURE — 90651 9VHPV VACCINE 2/3 DOSE IM: CPT | Performed by: NURSE PRACTITIONER

## 2022-12-15 RX ORDER — VALACYCLOVIR HYDROCHLORIDE 1 G/1
TABLET, FILM COATED ORAL
Qty: 30 TABLET | Refills: 0 | Status: SHIPPED | OUTPATIENT
Start: 2022-12-15 | End: 2023-02-23 | Stop reason: SDUPTHER

## 2023-01-27 ENCOUNTER — OFFICE VISIT (OUTPATIENT)
Dept: FAMILY MEDICINE CLINIC | Facility: CLINIC | Age: 23
End: 2023-01-27
Payer: COMMERCIAL

## 2023-01-27 VITALS
HEART RATE: 94 BPM | BODY MASS INDEX: 40.81 KG/M2 | HEIGHT: 58 IN | OXYGEN SATURATION: 97 % | SYSTOLIC BLOOD PRESSURE: 111 MMHG | WEIGHT: 194.4 LBS | DIASTOLIC BLOOD PRESSURE: 71 MMHG

## 2023-01-27 DIAGNOSIS — N92.6 IRREGULAR MENSES: ICD-10-CM

## 2023-01-27 DIAGNOSIS — Z32.00 ENCOUNTER FOR PREGNANCY TEST, RESULT UNKNOWN: Primary | ICD-10-CM

## 2023-01-27 LAB
B-HCG UR QL: NEGATIVE
EXPIRATION DATE: NORMAL
INTERNAL NEGATIVE CONTROL: NORMAL
INTERNAL POSITIVE CONTROL: NORMAL
Lab: NORMAL

## 2023-01-27 PROCEDURE — 81025 URINE PREGNANCY TEST: CPT | Performed by: NURSE PRACTITIONER

## 2023-01-27 PROCEDURE — 99213 OFFICE O/P EST LOW 20 MIN: CPT | Performed by: NURSE PRACTITIONER

## 2023-01-27 RX ORDER — ETONOGESTREL AND ETHINYL ESTRADIOL 11.7; 2.7 MG/1; MG/1
INSERT, EXTENDED RELEASE VAGINAL
Qty: 1 EACH | Refills: 11 | Status: SHIPPED | OUTPATIENT
Start: 2023-01-27

## 2023-01-27 NOTE — PROGRESS NOTES
"Chief Complaint  Contraception (Pt wants to discuss options for birth control.)    Subjective      Lesly Bonilla presents to [facility]   History of Present Illness    The patient presents today for a follow-up and talk about birth control.    Birth control - The patient's pregnancy test was negative. She denies ever being on birth control in the past. She would like to be on birth control to regulate her cycles. She is not sexually active. The patient would like to have the NuvaRing over oral because she feels like she is less likely to forget it. She states her sister likes the ease of it, just having to put it in once a month and go from there. Her sister has been on her birth control for a long time. She did fine with her Pap smear.    She  has no past medical history on file.     Objective   Vital Signs:   /71 (BP Location: Left arm, Patient Position: Sitting, Cuff Size: Large Adult)   Pulse 94   Ht 147.3 cm (58\")   Wt 88.2 kg (194 lb 6.4 oz)   SpO2 97%   BMI 40.63 kg/m²     Physical Exam  Vitals reviewed.   Constitutional:       Appearance: Normal appearance. She is well-developed.   Neck:      Thyroid: No thyromegaly.      Vascular: No carotid bruit.   Cardiovascular:      Rate and Rhythm: Normal rate and regular rhythm.      Heart sounds: No murmur heard.    No friction rub. No gallop.   Pulmonary:      Effort: Pulmonary effort is normal.      Breath sounds: Normal breath sounds. No wheezing or rhonchi.   Musculoskeletal:      Right lower leg: No edema.      Left lower leg: No edema.   Neurological:      Mental Status: She is alert and oriented to person, place, and time.      Cranial Nerves: No cranial nerve deficit.   Psychiatric:         Mood and Affect: Mood and affect normal.         Behavior: Behavior normal.         Thought Content: Thought content normal.         Judgment: Judgment normal.        Result Review :             History reviewed. No pertinent " surgical history.   Family History   Problem Relation Age of Onset   • Cancer Mother    • Stroke Mother    • Clotting disorder Mother    • Cancer Father    • Diverticulitis Father    • Thyroid disease Maternal Grandmother    • Hypertension Maternal Grandmother         Current Outpatient Medications:   •  acetaminophen (TYLENOL) 500 MG tablet, Take 500 mg by mouth Every 6 (Six) Hours As Needed for Mild Pain ., Disp: , Rfl:   •  ibuprofen (ADVIL,MOTRIN) 200 MG tablet, Take 200 mg by mouth Every 6 (Six) Hours As Needed for Mild Pain ., Disp: , Rfl:   •  valACYclovir (Valtrex) 1000 MG tablet, Take 2 tabs (2000mg) PO then repeat dose in 12 hours x 1 day prn cold sore, Disp: 30 tablet, Rfl: 0  •  etonogestrel-ethinyl estradiol (NuvaRing) 0.12-0.015 MG/24HR vaginal ring, Insert vaginally and leave in place for 3 consecutive weeks, then remove for 1 week., Disp: 1 each, Rfl: 11   Assessment and Plan   Diagnoses and all orders for this visit:    1. Encounter for pregnancy test, result unknown (Primary)  Comments:  - The patient's pregnancy test was negative.    Orders:  -     POC Pregnancy, Urine    2. Irregular menses  Comments:    - A prescription for NuvaRing was sent to the patient's pharmacy.  Orders:  -     etonogestrel-ethinyl estradiol (NuvaRing) 0.12-0.015 MG/24HR vaginal ring; Insert vaginally and leave in place for 3 consecutive weeks, then remove for 1 week.  Dispense: 1 each; Refill: 11        Follow Up   Return in about 1 year (around 1/27/2024).  Patient was given instructions and counseling regarding her condition or for health maintenance advice. Please see specific information pulled into the AVS if appropriate.               Transcribed from ambient dictation for MARIJA Arce by Lavern Nichole.  01/27/23   15:50 EST    Patient or patient representative verbalized consent to the visit recording.  I have personally performed the services described in this document as transcribed by the above  individual, and it is both accurate and complete.

## 2023-02-20 ENCOUNTER — TELEPHONE (OUTPATIENT)
Dept: FAMILY MEDICINE CLINIC | Facility: CLINIC | Age: 23
End: 2023-02-20

## 2023-02-20 NOTE — TELEPHONE ENCOUNTER
Caller: [facility]    Relationship: [facility]    Best call back number: [phone]    Requested Prescriptions:   Requested Prescriptions      No prescriptions requested or ordered in this encounter    ELURYNG 0.12MG-0.015MG    Pharmacy where request should be sent: [facility], [address] - [phone]  - [phone]      Additional details provided by patient: PHARMACIST NOT SURE HOW MUCH MEDICATION PATIENT HAS LEFT. PLEASE SEND NEW PRESCRIPTION WITH REFILLS TO PHARMACY ASAP.    Does the patient have less than a 3 day supply:  [x] Yes  [] No    Would you like a call back once the refill request has been completed: [] Yes [] No    If the office needs to give you a call back, can they leave a voicemail: [] Yes [] No    Karen Humphries Rep   02/20/23 12:56 EST

## 2023-02-22 ENCOUNTER — TELEPHONE (OUTPATIENT)
Dept: FAMILY MEDICINE CLINIC | Facility: CLINIC | Age: 23
End: 2023-02-22
Payer: COMMERCIAL

## 2023-02-22 ENCOUNTER — CLINICAL SUPPORT (OUTPATIENT)
Dept: FAMILY MEDICINE CLINIC | Facility: CLINIC | Age: 23
End: 2023-02-22
Payer: COMMERCIAL

## 2023-02-22 DIAGNOSIS — Z23 NEED FOR COVID-19 VACCINE: Primary | ICD-10-CM

## 2023-02-22 PROCEDURE — 91305 COVID-19 (PFIZER) 12+ YRS: CPT | Performed by: NURSE PRACTITIONER

## 2023-02-22 PROCEDURE — 0051A COVID-19 (PFIZER) 12+ YRS: CPT | Performed by: NURSE PRACTITIONER

## 2023-02-22 NOTE — TELEPHONE ENCOUNTER
Pt called asking if we have any Covid Vaccines available.    Pt was informed that we do and that she could do a walk in at anytime to receive the first dose.

## 2023-02-23 DIAGNOSIS — B00.1 RECURRENT COLD SORES: ICD-10-CM

## 2023-02-23 RX ORDER — VALACYCLOVIR HYDROCHLORIDE 1 G/1
TABLET, FILM COATED ORAL
Qty: 30 TABLET | Refills: 0 | Status: SHIPPED | OUTPATIENT
Start: 2023-02-23

## 2023-03-15 ENCOUNTER — CLINICAL SUPPORT (OUTPATIENT)
Dept: FAMILY MEDICINE CLINIC | Facility: CLINIC | Age: 23
End: 2023-03-15
Payer: COMMERCIAL

## 2023-03-15 DIAGNOSIS — Z28.311 NEED FOR SECOND DOSE OF COVID-19 VACCINE: Primary | ICD-10-CM

## 2023-03-15 DIAGNOSIS — Z23 NEED FOR SECOND DOSE OF COVID-19 VACCINE: Primary | ICD-10-CM

## 2023-03-15 PROCEDURE — 0052A COVID-19 (PFIZER) 12+ YRS: CPT | Performed by: NURSE PRACTITIONER

## 2023-03-15 PROCEDURE — 91305 COVID-19 (PFIZER) 12+ YRS: CPT | Performed by: NURSE PRACTITIONER

## 2023-04-17 ENCOUNTER — TELEPHONE (OUTPATIENT)
Dept: DIABETES SERVICES | Facility: HOSPITAL | Age: 23
End: 2023-04-17
Payer: COMMERCIAL

## 2023-04-17 NOTE — TELEPHONE ENCOUNTER
Caller: Lesly Bonilla    Relationship: Self    Best call back number: [phone]    What is the best time to reach you: ANY    Who are you requesting to speak with (clinical staff, provider,  specific staff member): CLINICAL    What was the call regarding: PATIENT IS DUE FOR HER NEXT HPV SHOT AND WOULD LIKE TO KNOW HOW SOON SHE NEEDS TO HAVE THAT DONE. PLEASE CALL AND ADVISE.     Do you require a callback: YES

## 2023-04-17 NOTE — TELEPHONE ENCOUNTER
First shot was on 12/15/22 is ok for pt to wait until she est care with you on 05/11 to get second shot?     Spoke with pt and stated to wait until she sees another provider, will call back if recommended to come in sooner

## 2023-05-11 ENCOUNTER — OFFICE VISIT (OUTPATIENT)
Dept: FAMILY MEDICINE CLINIC | Facility: CLINIC | Age: 23
End: 2023-05-11
Payer: COMMERCIAL

## 2023-05-11 VITALS
WEIGHT: 191 LBS | BODY MASS INDEX: 40.09 KG/M2 | HEIGHT: 58 IN | OXYGEN SATURATION: 99 % | DIASTOLIC BLOOD PRESSURE: 82 MMHG | SYSTOLIC BLOOD PRESSURE: 107 MMHG | HEART RATE: 87 BPM

## 2023-05-11 DIAGNOSIS — Z13.29 SCREENING FOR THYROID DISORDER: ICD-10-CM

## 2023-05-11 DIAGNOSIS — E55.9 VITAMIN D DEFICIENCY: ICD-10-CM

## 2023-05-11 DIAGNOSIS — Z23 NEED FOR TDAP VACCINATION: ICD-10-CM

## 2023-05-11 DIAGNOSIS — N92.6 IRREGULAR MENSES: Primary | ICD-10-CM

## 2023-05-11 DIAGNOSIS — Z23 NEED FOR FIRST BOOSTER DOSE OF COVID-19 VACCINE: ICD-10-CM

## 2023-05-11 DIAGNOSIS — Z13.6 ENCOUNTER FOR LIPID SCREENING FOR CARDIOVASCULAR DISEASE: ICD-10-CM

## 2023-05-11 DIAGNOSIS — Z13.220 ENCOUNTER FOR LIPID SCREENING FOR CARDIOVASCULAR DISEASE: ICD-10-CM

## 2023-05-11 DIAGNOSIS — Z30.44 ENCOUNTER FOR SURVEILLANCE OF VAGINAL RING HORMONAL CONTRACEPTIVE DEVICE: ICD-10-CM

## 2023-05-11 RX ORDER — ETONOGESTREL AND ETHINYL ESTRADIOL 11.7; 2.7 MG/1; MG/1
INSERT, EXTENDED RELEASE VAGINAL
Qty: 1 EACH | Refills: 1 | Status: SHIPPED | OUTPATIENT
Start: 2023-05-11

## 2023-05-11 NOTE — PROGRESS NOTES
"Chief Complaint  Establish Care    SUBJECTIVE  Lesly Bonilla presents to [facility]    History of Present Illness  22-year-old Lesly Garcia presents today establish care.  She is a previous patient of Diann Rayo.      Patient presents today stating that she needs a COVID booster, HPV injection and Tdap injection.  Orders have been placed for these.    Patient would also like a refill on the NuvaRing.  We did discuss that she will come in within the next 3 months to have a Pap done in order to continue providing this.  She states that she is doing well on this and that it has helped with her irregular periods.        No past medical history on file.   Family History   Problem Relation Age of Onset   • Cancer Mother    • Stroke Mother    • Clotting disorder Mother    • Cancer Father    • Diverticulitis Father    • Thyroid disease Maternal Grandmother    • Hypertension Maternal Grandmother    • Cancer Maternal Grandmother    • Stroke Maternal Grandfather       No past surgical history on file.     Current Outpatient Medications:   •  acetaminophen (TYLENOL) 500 MG tablet, Take 1 tablet by mouth Every 6 (Six) Hours As Needed for Mild Pain., Disp: , Rfl:   •  etonogestrel-ethinyl estradiol (NuvaRing) 0.12-0.015 MG/24HR vaginal ring, Insert vaginally and leave in place for 3 consecutive weeks, then remove for 1 week., Disp: 1 each, Rfl: 1  •  ibuprofen (ADVIL,MOTRIN) 200 MG tablet, Take 1 tablet by mouth Every 6 (Six) Hours As Needed for Mild Pain., Disp: , Rfl:   •  valACYclovir (Valtrex) 1000 MG tablet, Take 2 tabs (2000mg) PO then repeat dose in 12 hours x 1 day prn cold sore, Disp: 30 tablet, Rfl: 0    OBJECTIVE  Vital Signs:   /82 (BP Location: Right arm, Patient Position: Sitting, Cuff Size: Large Adult)   Pulse 87   Ht 147.3 cm (58\")   Wt 86.6 kg (191 lb)   SpO2 99%   BMI 39.92 kg/m²    Estimated body mass index is 39.92 kg/m² as calculated from the following:    " "Height as of this encounter: 147.3 cm (58\").    Weight as of this encounter: 86.6 kg (191 lb).     Wt Readings from Last 3 Encounters:   05/11/23 86.6 kg (191 lb)   01/27/23 88.2 kg (194 lb 6.4 oz)   04/28/22 85.3 kg (188 lb)     BP Readings from Last 3 Encounters:   05/11/23 107/82   01/27/23 111/71   04/28/22 112/66       Physical Exam  Vitals and nursing note reviewed.   Constitutional:       Appearance: Normal appearance. She is obese.   HENT:      Head: Normocephalic and atraumatic.      Nose: Nose normal.      Mouth/Throat:      Mouth: Mucous membranes are moist.   Eyes:      Conjunctiva/sclera: Conjunctivae normal.      Pupils: Pupils are equal, round, and reactive to light.   Cardiovascular:      Rate and Rhythm: Normal rate and regular rhythm.      Pulses: Normal pulses.      Heart sounds: Normal heart sounds.   Pulmonary:      Effort: Pulmonary effort is normal.      Breath sounds: Normal breath sounds.   Abdominal:      General: Abdomen is flat.      Palpations: Abdomen is soft.   Musculoskeletal:         General: Normal range of motion.      Cervical back: Normal range of motion and neck supple.   Skin:     General: Skin is warm and dry.   Neurological:      General: No focal deficit present.      Mental Status: She is alert and oriented to person, place, and time. Mental status is at baseline.   Psychiatric:         Mood and Affect: Mood normal.         Behavior: Behavior normal.         Thought Content: Thought content normal.         Judgment: Judgment normal.          Result Review          Lab Results   Component Value Date    GDEK26NW 15.6 (L) 04/16/2022        Lab Results   Component Value Date    FREET4 1.2 08/11/2020     Lab Results   Component Value Date    YLURZJOE92 754 04/16/2022       No Images in the past 120 days found..      The above data has been reviewed by MARIJA Bustos 05/11/2023 15:16 EDT.          Patient Care Team:  Lindsay Hartman APRN as PCP - General (Emergency " Medicine)    Class 2 Severe Obesity (BMI >=35 and <=39.9). Obesity-related health conditions include the following: none. Obesity is newly identified. BMI is is above average; BMI management plan is completed. We discussed portion control and increasing exercise.       ASSESSMENT & PLAN    Diagnoses and all orders for this visit:    1. Irregular menses (Primary)  Comments:  NuvaRing prescription has been renewed and patient will return within 3 months to have Pap and annual physical  Orders:  -     etonogestrel-ethinyl estradiol (NuvaRing) 0.12-0.015 MG/24HR vaginal ring; Insert vaginally and leave in place for 3 consecutive weeks, then remove for 1 week.  Dispense: 1 each; Refill: 1  -     CBC w AUTO Differential; Future  -     Comprehensive metabolic panel; Future    2. Need for Tdap vaccination  Comments:  In office Tdap injection was given  Orders:  -     Tdap Vaccine Greater Than or Equal To 6yo IM    3. Need for first booster dose of COVID-19 vaccine  Comments:  In office COVID booster was given  Orders:  -     COVID-19 (Pfizer) Bivalent 12+yrs    4. Encounter for surveillance of vaginal ring hormonal contraceptive device  Comments:  Prescription has been renewed    5. Screening for thyroid disorder  -     TSH; Future    6. Encounter for lipid screening for cardiovascular disease  -     Lipid panel; Future    7. Vitamin D deficiency  -     Vitamin D 25 hydroxy; Future         Tobacco Use: Low Risk    • Smoking Tobacco Use: Never   • Smokeless Tobacco Use: Never   • Passive Exposure: Not on file       Follow Up     No follow-ups on file.      Patient was given instructions and counseling regarding her condition or for health maintenance advice. Please see specific information pulled into the AVS if appropriate.   I have reviewed information obtained and documented by others and I have confirmed the accuracy of this documented note.    MARIJA Bustos

## 2023-05-12 ENCOUNTER — LAB (OUTPATIENT)
Dept: LAB | Facility: HOSPITAL | Age: 23
End: 2023-05-12
Payer: COMMERCIAL

## 2023-05-12 DIAGNOSIS — N92.6 IRREGULAR MENSES: ICD-10-CM

## 2023-05-12 DIAGNOSIS — Z13.29 SCREENING FOR THYROID DISORDER: ICD-10-CM

## 2023-05-12 DIAGNOSIS — Z13.6 ENCOUNTER FOR LIPID SCREENING FOR CARDIOVASCULAR DISEASE: ICD-10-CM

## 2023-05-12 DIAGNOSIS — Z13.220 ENCOUNTER FOR LIPID SCREENING FOR CARDIOVASCULAR DISEASE: ICD-10-CM

## 2023-05-12 DIAGNOSIS — E55.9 VITAMIN D DEFICIENCY: ICD-10-CM

## 2023-05-12 LAB
25(OH)D3 SERPL-MCNC: 30.9 NG/ML (ref 30–100)
ALBUMIN SERPL-MCNC: 4.4 G/DL (ref 3.5–5.2)
ALBUMIN/GLOB SERPL: 1.3 G/DL
ALP SERPL-CCNC: 103 U/L (ref 39–117)
ALT SERPL W P-5'-P-CCNC: 13 U/L (ref 1–33)
ANION GAP SERPL CALCULATED.3IONS-SCNC: 9 MMOL/L (ref 5–15)
AST SERPL-CCNC: 14 U/L (ref 1–32)
BASOPHILS # BLD AUTO: 0.02 10*3/MM3 (ref 0–0.2)
BASOPHILS NFR BLD AUTO: 0.3 % (ref 0–1.5)
BILIRUB SERPL-MCNC: 0.3 MG/DL (ref 0–1.2)
BUN SERPL-MCNC: 14 MG/DL (ref 6–20)
BUN/CREAT SERPL: 20 (ref 7–25)
CALCIUM SPEC-SCNC: 9.5 MG/DL (ref 8.6–10.5)
CHLORIDE SERPL-SCNC: 103 MMOL/L (ref 98–107)
CHOLEST SERPL-MCNC: 180 MG/DL (ref 0–200)
CO2 SERPL-SCNC: 25 MMOL/L (ref 22–29)
CREAT SERPL-MCNC: 0.7 MG/DL (ref 0.57–1)
DEPRECATED RDW RBC AUTO: 37.5 FL (ref 37–54)
EGFRCR SERPLBLD CKD-EPI 2021: 126.4 ML/MIN/1.73
EOSINOPHIL # BLD AUTO: 0.05 10*3/MM3 (ref 0–0.4)
EOSINOPHIL NFR BLD AUTO: 0.8 % (ref 0.3–6.2)
ERYTHROCYTE [DISTWIDTH] IN BLOOD BY AUTOMATED COUNT: 12.7 % (ref 12.3–15.4)
GLOBULIN UR ELPH-MCNC: 3.5 GM/DL
GLUCOSE SERPL-MCNC: 83 MG/DL (ref 65–99)
HCT VFR BLD AUTO: 41.3 % (ref 34–46.6)
HDLC SERPL-MCNC: 63 MG/DL (ref 40–60)
HGB BLD-MCNC: 13.6 G/DL (ref 12–15.9)
IMM GRANULOCYTES # BLD AUTO: 0.02 10*3/MM3 (ref 0–0.05)
IMM GRANULOCYTES NFR BLD AUTO: 0.3 % (ref 0–0.5)
LDLC SERPL CALC-MCNC: 105 MG/DL (ref 0–100)
LDLC/HDLC SERPL: 1.67 {RATIO}
LYMPHOCYTES # BLD AUTO: 2.04 10*3/MM3 (ref 0.7–3.1)
LYMPHOCYTES NFR BLD AUTO: 30.9 % (ref 19.6–45.3)
MCH RBC QN AUTO: 26.9 PG (ref 26.6–33)
MCHC RBC AUTO-ENTMCNC: 32.9 G/DL (ref 31.5–35.7)
MCV RBC AUTO: 81.8 FL (ref 79–97)
MONOCYTES # BLD AUTO: 0.45 10*3/MM3 (ref 0.1–0.9)
MONOCYTES NFR BLD AUTO: 6.8 % (ref 5–12)
NEUTROPHILS NFR BLD AUTO: 4.02 10*3/MM3 (ref 1.7–7)
NEUTROPHILS NFR BLD AUTO: 60.9 % (ref 42.7–76)
NRBC BLD AUTO-RTO: 0 /100 WBC (ref 0–0.2)
PLATELET # BLD AUTO: 320 10*3/MM3 (ref 140–450)
PMV BLD AUTO: 10.8 FL (ref 6–12)
POTASSIUM SERPL-SCNC: 4.3 MMOL/L (ref 3.5–5.2)
PROT SERPL-MCNC: 7.9 G/DL (ref 6–8.5)
RBC # BLD AUTO: 5.05 10*6/MM3 (ref 3.77–5.28)
SODIUM SERPL-SCNC: 137 MMOL/L (ref 136–145)
TRIGL SERPL-MCNC: 60 MG/DL (ref 0–150)
TSH SERPL DL<=0.05 MIU/L-ACNC: 1.36 UIU/ML (ref 0.27–4.2)
VLDLC SERPL-MCNC: 12 MG/DL (ref 5–40)
WBC NRBC COR # BLD: 6.6 10*3/MM3 (ref 3.4–10.8)

## 2023-05-12 PROCEDURE — 80050 GENERAL HEALTH PANEL: CPT

## 2023-05-12 PROCEDURE — 36415 COLL VENOUS BLD VENIPUNCTURE: CPT

## 2023-05-12 PROCEDURE — 80061 LIPID PANEL: CPT

## 2023-05-12 PROCEDURE — 82306 VITAMIN D 25 HYDROXY: CPT

## 2023-05-15 ENCOUNTER — PATIENT ROUNDING (BHMG ONLY) (OUTPATIENT)
Dept: FAMILY MEDICINE CLINIC | Facility: CLINIC | Age: 23
End: 2023-05-15
Payer: COMMERCIAL

## 2023-05-25 ENCOUNTER — CLINICAL SUPPORT (OUTPATIENT)
Dept: FAMILY MEDICINE CLINIC | Facility: CLINIC | Age: 23
End: 2023-05-25
Payer: COMMERCIAL

## 2023-05-25 DIAGNOSIS — Z23 NEED FOR TDAP VACCINATION: Primary | ICD-10-CM

## 2023-05-25 DIAGNOSIS — Z23 NEED FOR HPV VACCINATION: ICD-10-CM

## 2023-05-25 PROCEDURE — 90471 IMMUNIZATION ADMIN: CPT

## 2023-05-25 PROCEDURE — 90651 9VHPV VACCINE 2/3 DOSE IM: CPT

## 2023-08-11 ENCOUNTER — OFFICE VISIT (OUTPATIENT)
Dept: FAMILY MEDICINE CLINIC | Facility: CLINIC | Age: 23
End: 2023-08-11
Payer: COMMERCIAL

## 2023-08-11 VITALS
HEIGHT: 58 IN | SYSTOLIC BLOOD PRESSURE: 127 MMHG | OXYGEN SATURATION: 99 % | HEART RATE: 89 BPM | WEIGHT: 185 LBS | DIASTOLIC BLOOD PRESSURE: 77 MMHG | BODY MASS INDEX: 38.83 KG/M2

## 2023-08-11 DIAGNOSIS — N89.9 NODULE OF VAGINA: Primary | ICD-10-CM

## 2023-08-11 PROCEDURE — G0123 SCREEN CERV/VAG THIN LAYER: HCPCS

## 2023-08-11 NOTE — PROGRESS NOTES
"Chief Complaint  Gynecologic Exam PT HERE FOR ANNUAL PAP EXAM     SUBJECTIVE  Lesly Bonilla presents to [facility]    History of Present Illness  Lesly Bonilla presents today for a Pap.  She was told by previous PCP that she would need one in 1 year as she had a nodule that was growing on right labia.  States that the nodule is hard and occasionally bothersome.  She has not been to gynecology yet to discuss removal.    Patient's last menstrual period was 07/31/2023 (approximate).    History reviewed. No pertinent past medical history.   Family History   Problem Relation Age of Onset    Cancer Mother     Stroke Mother     Clotting disorder Mother     Cancer Father     Diverticulitis Father     Thyroid disease Maternal Grandmother     Hypertension Maternal Grandmother     Cancer Maternal Grandmother     Stroke Maternal Grandfather       History reviewed. No pertinent surgical history.     Current Outpatient Medications:     acetaminophen (TYLENOL) 500 MG tablet, Take 1 tablet by mouth Every 6 (Six) Hours As Needed for Mild Pain., Disp: , Rfl:     etonogestrel-ethinyl estradiol (NuvaRing) 0.12-0.015 MG/24HR vaginal ring, Insert vaginally and leave in place for 3 consecutive weeks, then remove for 1 week., Disp: 1 each, Rfl: 1    ibuprofen (ADVIL,MOTRIN) 200 MG tablet, Take 1 tablet by mouth Every 6 (Six) Hours As Needed for Mild Pain., Disp: , Rfl:     valACYclovir (Valtrex) 1000 MG tablet, Take 2 tabs (2000mg) PO then repeat dose in 12 hours x 1 day prn cold sore, Disp: 30 tablet, Rfl: 0    OBJECTIVE  Vital Signs:   /77   Pulse 89   Ht 147.3 cm (58\")   Wt 83.9 kg (185 lb)   LMP 07/31/2023 (Approximate)   SpO2 99%   BMI 38.67 kg/mý    Estimated body mass index is 38.67 kg/mý as calculated from the following:    Height as of this encounter: 147.3 cm (58\").    Weight as of this encounter: 83.9 kg (185 lb).     Wt Readings from Last 3 Encounters:   08/11/23 83.9 kg (185 lb) "   05/11/23 86.6 kg (191 lb)   01/27/23 88.2 kg (194 lb 6.4 oz)     BP Readings from Last 3 Encounters:   08/11/23 127/77   05/11/23 107/82   01/27/23 111/71       Physical Exam  Constitutional:       Appearance: Normal appearance. She is obese.   HENT:      Head: Normocephalic and atraumatic.      Nose: Nose normal.      Mouth/Throat:      Mouth: Mucous membranes are moist.   Cardiovascular:      Rate and Rhythm: Normal rate and regular rhythm.      Pulses: Normal pulses.      Heart sounds: Normal heart sounds.   Pulmonary:      Breath sounds: Normal breath sounds.   Abdominal:      General: Abdomen is flat.      Palpations: Abdomen is soft.   Genitourinary:     Comments: There is a hard nodule on right labia  Musculoskeletal:         General: Normal range of motion.      Cervical back: Normal range of motion and neck supple.   Skin:     General: Skin is warm and dry.   Neurological:      General: No focal deficit present.      Mental Status: She is alert and oriented to person, place, and time. Mental status is at baseline.   Psychiatric:         Mood and Affect: Mood normal.         Behavior: Behavior normal.         Thought Content: Thought content normal.         Judgment: Judgment normal.        Result Review    Common labs          5/12/2023    09:40   Common Labs   Glucose 83    BUN 14    Creatinine 0.70    Sodium 137    Potassium 4.3    Chloride 103    Calcium 9.5    Albumin 4.4    Total Bilirubin 0.3    Alkaline Phosphatase 103    AST (SGOT) 14    ALT (SGPT) 13    WBC 6.60    Hemoglobin 13.6    Hematocrit 41.3    Platelets 320    Total Cholesterol 180    Triglycerides 60    HDL Cholesterol 63    LDL Cholesterol  105        No Images in the past 120 days found..      The above data has been reviewed by MARIJA Bustos 08/11/2023 10:48 EDT.          Patient Care Team:  Lindsay Hartman APRN as PCP - General (Emergency Medicine)            ASSESSMENT & PLAN    Diagnoses and all orders for this  visit:    1. Nodule of vagina (Primary)  Comments:  Have referred patient to gynecology for removal.  We did a Pap in office today.  Orders:  -     IGP,rfx Aptima HPV All Pth  -     Ambulatory Referral to Gynecology         Tobacco Use: Low Risk     Smoking Tobacco Use: Never    Smokeless Tobacco Use: Never    Passive Exposure: Not on file       Follow Up     No follow-ups on file.      Patient was given instructions and counseling regarding her condition or for health maintenance advice. Please see specific information pulled into the AVS if appropriate.   I have reviewed information obtained and documented by others and I have confirmed the accuracy of this documented note.    MARIJA Bustos

## 2023-08-16 LAB
CONV .: NORMAL
CYTOLOGIST CVX/VAG CYTO: NORMAL
CYTOLOGY CVX/VAG DOC CYTO: NORMAL
CYTOLOGY CVX/VAG DOC THIN PREP: NORMAL
DX ICD CODE: NORMAL
HIV 1 & 2 AB SER-IMP: NORMAL
OTHER STN SPEC: NORMAL
STAT OF ADQ CVX/VAG CYTO-IMP: NORMAL

## 2023-11-02 ENCOUNTER — OFFICE VISIT (OUTPATIENT)
Dept: OBSTETRICS AND GYNECOLOGY | Facility: CLINIC | Age: 23
End: 2023-11-02
Payer: COMMERCIAL

## 2023-11-02 VITALS
DIASTOLIC BLOOD PRESSURE: 89 MMHG | WEIGHT: 192.2 LBS | SYSTOLIC BLOOD PRESSURE: 124 MMHG | HEIGHT: 58 IN | BODY MASS INDEX: 40.34 KG/M2 | HEART RATE: 101 BPM

## 2023-11-02 DIAGNOSIS — N89.8 VAGINAL INCLUSION CYST: Primary | ICD-10-CM

## 2023-11-02 DIAGNOSIS — E66.01 MORBID OBESITY WITH BMI OF 40.0-44.9, ADULT: ICD-10-CM

## 2023-11-02 PROCEDURE — 99203 OFFICE O/P NEW LOW 30 MIN: CPT | Performed by: STUDENT IN AN ORGANIZED HEALTH CARE EDUCATION/TRAINING PROGRAM

## 2023-11-02 PROCEDURE — 81025 URINE PREGNANCY TEST: CPT | Performed by: STUDENT IN AN ORGANIZED HEALTH CARE EDUCATION/TRAINING PROGRAM

## 2023-11-02 RX ORDER — MELOXICAM 15 MG/1
15 TABLET ORAL DAILY PRN
COMMUNITY
Start: 2023-09-29

## 2023-11-02 NOTE — PROGRESS NOTES
"Problem visit     Chief Complaint   Patient presents with    New GYN Patient    Vaginal Bump           HPI  Lesly Bonilla is a 22 y.o. female, , who presents for problemt visit for vaginal \"bump\" Patient's last menstrual period was 10/07/2023 (exact date)..  Concerns for a \"bump\" has been present between 3-10 years. Feels that size has not changed. No pain, if she pushes on it she can feel it. Right side near urethra.  Periods are regular every 25-35 days, lasting 6 days. .  Does use menstrual jameel. Dysmenorrhea:none. No bleeding between periods. No vaginal discharge or odor. Has never had sex. Does Nuvaring for BC and menses regulation.  Normal urinary habits normal bowel movements. Has had PAP smear with PCP for the past two years. Has received the Gardasil vaccination series.   There were no changes to her medical or surgical history since her last visit.. Partner Status: Marital Status: single.  New Partners since last visit: no.  Desires STD Screening: no.    Additional OB/GYN History   Current contraception: contraceptive methods: NuvaRing vaginal inserts  Desires to: continue contraception  Last Pap :   Last Completed Pap Smear            PAP SMEAR (Every 3 Years) Next due on 2023  IGP,rfx Aptima HPV All Pth    2022  IgP, Aptima HPV                  Result: Normal  History of abnormal Pap smear: no  Last MMG :   Last Completed Mammogram       This patient has no relevant Health Maintenance data.           Last Colonoscopy :   Last Completed Colonoscopy       This patient has no relevant Health Maintenance data.          Hx [facility] intake? : Yes.  Qualified? : No    AllergiesPatient has no known allergies.   Family history of uterine, colon, breast, or ovarian cancer: no  Tobacco Usage?: No   OB History          0    Para   0    Term   0       0    AB   0    Living   0         SAB   0    IAB   0    Ectopic   0    Molar   0    Multiple   0    Live Births   0    " PAT PASS GIVEN/REVIEWED WITH PT.  VERBALIZED UNDERSTANDING OF THE FOLLOWING:  DO NOT EAT, DRINK, SMOKE, USE SMOKELESS TOBACCO OR CHEW GUM AFTER MIDNIGHT THE NIGHT BEFORE SURGERY.  THIS ALSO INCLUDES HARD CANDIES AND MINTS.    DO NOT SHAVE THE AREA TO BE OPERATED ON AT LEAST 48 HOURS PRIOR TO THE PROCEDURE.  DO NOT WEAR MAKE UP OR NAIL POLISH.  DO NOT LEAVE IN ANY PIERCING OR WEAR JEWELRY THE DAY OF SURGERY.      DO NOT USE ADHESIVES IF YOU WEAR DENTURES.    DO NOT WEAR EYE CONTACTS; BRING IN YOUR GLASSES.    ONLY TAKE MEDICATION THE MORNING OF YOUR PROCEDURE IF INSTRUCTED BY YOUR SURGEON WITH ENOUGH WATER TO SWALLOW THE MEDICATION.  IF YOUR SURGEON DID NOT SPECIFY WHICH MEDICATIONS TO TAKE, YOU WILL NEED TO CALL THEIR OFFICE FOR FURTHER INSTRUCTIONS AND DO AS THEY INSTRUCT.    LEAVE ANYTHING YOU CONSIDER VALUABLE AT HOME.    YOU WILL NEED TO ARRANGE FOR SOMEONE TO DRIVE YOU HOME AFTER SURGERY.  IT IS RECOMMENDED THAT YOU DO NOT DRIVE, WORK, DRINK ALCOHOL OR MAKE MAJOR DECISIONS FOR AT LEAST 24 HOURS AFTER YOUR PROCEDURE IS COMPLETE.      THE DAY OF YOUR PROCEDURE, BRING IN THE FOLLOWING IF APPLICABLE:   PICTURE ID AND INSURANCE/MEDICARE OR MEDICAID CARDS/ANY CO-PAY THAT MAY BE DUE   COPY OF ADVANCED DIRECTIVE/LIVING WILL/POWER OR    CPAP/BIPAP/INHALERS   SKIN PREP SHEET   YOUR PREADMISSION TESTING PASS (IF NOT A PHONE HISTORY)       Introduction to anesthesia video viewed by pt in PAT.      "            The additional following portions of the patient's history were reviewed and updated as appropriate: allergies, current medications, past family history, past medical history, past social history, past surgical history, and problem list.    Review of Systems   Constitutional:  Negative for fatigue and fever.   Respiratory:  Negative for cough and shortness of breath.    Cardiovascular:  Negative for chest pain.   Gastrointestinal:  Negative for abdominal distention and abdominal pain.   Genitourinary:  Negative for breast discharge, breast lump, breast pain, difficulty urinating, dysuria, menstrual problem, pelvic pain, pelvic pressure, vaginal bleeding, vaginal discharge and vaginal pain.        Vaginal lesion       I have reviewed and agree with the HPI, ROS, and historical information as entered above. Jose Armando Jefferson MD    Objective   /89   Pulse 101   Ht 147.3 cm (58\")   Wt 87.2 kg (192 lb 3.2 oz)   LMP 10/07/2023 (Exact Date)   Breastfeeding No   BMI 40.17 kg/m²     Physical Exam  Vitals and nursing note reviewed. Exam conducted with a chaperone present.   Constitutional:       General: She is not in acute distress.     Appearance: Normal appearance. She is not toxic-appearing.   HENT:      Head: Normocephalic and atraumatic.   Eyes:      Extraocular Movements: Extraocular movements intact.      Conjunctiva/sclera: Conjunctivae normal.   Cardiovascular:      Pulses: Normal pulses.   Pulmonary:      Effort: Pulmonary effort is normal.   Abdominal:      General: There is no distension.      Palpations: Abdomen is soft.      Tenderness: There is no abdominal tenderness.      Hernia: There is no hernia in the left inguinal area or right inguinal area.   Genitourinary:     General: Normal vulva.      Exam position: Lithotomy position.      Labia:         Right: No tenderness, lesion or injury.         Left: No tenderness, lesion or injury.       Urethra: No prolapse, urethral pain, urethral " swelling or urethral lesion.          Comments: 2mm vaginal inclusion cyst to the right of the urethra, nonobstructing. Nontender, no erythema, no discharge  Musculoskeletal:      Cervical back: Normal range of motion.   Lymphadenopathy:      Lower Body: No right inguinal adenopathy. No left inguinal adenopathy.   Skin:     General: Skin is warm and dry.   Neurological:      Mental Status: She is alert and oriented to person, place, and time.   Psychiatric:         Mood and Affect: Mood normal.         Behavior: Behavior normal.         Thought Content: Thought content normal.            Assessment and Plan  Lesly Bonilla is a 22 y.o.  presenting for evaluation for vaginal bump.  Physical exam findings consistent with a vaginal inclusion cyst.  Discussed with patient that no intervention is required if not causing any symptoms.  Discussed with patient recommendations for diet and exercise with recommendation for 5 to 10% weight reduction in 6 to 12 months.  Vaccinations recommended.  Seatbelt in car.  Safe relationships.  Use of condoms to prevent sexually transmitted diseases if becomes sexually active.  Menses tracking.  Discussed cervical cancer screening with recommendation for next Pap smear in 3 years.  Congratulated patient for Gardasil vaccination series.  Counseled to never begin smoking.  Return to office in 1 year or as need be    Problem Visit    Diagnoses and all orders for this visit:    1. Vaginal inclusion cyst (Primary)    2. Morbid obesity with BMI of 40.0-44.9, adult  -     POC Pregnancy, Urine        Counseling:  Safe Sex/Condoms  Weight loss/Nutrition/Wt bearing exercise/Kegels/Core  Calcium/Vit D/PNV  Track menses, RTO IF <q21d, >7d long, or heavy        Does not qualify.      She understands the importance of having the above performed in a timely fashion.  The risks of not performing them include, but are not limited to, advanced cancer stages, bone loss from osteoporosis and/or  subsequent increase in morbidity and/or mortality.  She is encouraged to review her results online and/or contact or office if she has questions.     Follow Up:  Return in about 1 year (around 11/2/2024), or if symptoms worsen or fail to improve, for Annual physical.        Jose Armando Jefferson MD  11/02/2023

## 2025-02-07 ENCOUNTER — OFFICE VISIT (OUTPATIENT)
Dept: FAMILY MEDICINE CLINIC | Facility: CLINIC | Age: 25
End: 2025-02-07
Payer: COMMERCIAL

## 2025-02-07 ENCOUNTER — HOSPITAL ENCOUNTER (OUTPATIENT)
Dept: GENERAL RADIOLOGY | Facility: HOSPITAL | Age: 25
Discharge: HOME | End: 2025-02-07
Payer: COMMERCIAL

## 2025-02-07 ENCOUNTER — LAB (OUTPATIENT)
Dept: LAB | Facility: HOSPITAL | Age: 25
End: 2025-02-07
Payer: COMMERCIAL

## 2025-02-07 VITALS
SYSTOLIC BLOOD PRESSURE: 137 MMHG | HEIGHT: 58 IN | HEART RATE: 82 BPM | BODY MASS INDEX: 42.49 KG/M2 | DIASTOLIC BLOOD PRESSURE: 78 MMHG | OXYGEN SATURATION: 99 % | WEIGHT: 202.4 LBS

## 2025-02-07 DIAGNOSIS — G89.29 CHRONIC PAIN OF LEFT KNEE: ICD-10-CM

## 2025-02-07 DIAGNOSIS — Z13.220 SCREENING FOR LIPID DISORDERS: ICD-10-CM

## 2025-02-07 DIAGNOSIS — M25.572 LEFT ANKLE PAIN, UNSPECIFIED CHRONICITY: ICD-10-CM

## 2025-02-07 DIAGNOSIS — Z76.89 ESTABLISHING CARE WITH NEW DOCTOR, ENCOUNTER FOR: Primary | ICD-10-CM

## 2025-02-07 DIAGNOSIS — M25.552 LEFT HIP PAIN: ICD-10-CM

## 2025-02-07 DIAGNOSIS — G43.009 MIGRAINE WITHOUT AURA AND WITHOUT STATUS MIGRAINOSUS, NOT INTRACTABLE: ICD-10-CM

## 2025-02-07 DIAGNOSIS — B00.1 RECURRENT COLD SORES: ICD-10-CM

## 2025-02-07 DIAGNOSIS — M25.562 CHRONIC PAIN OF LEFT KNEE: ICD-10-CM

## 2025-02-07 DIAGNOSIS — Z13.29 SCREENING FOR THYROID DISORDER: ICD-10-CM

## 2025-02-07 DIAGNOSIS — Z00.00 ANNUAL PHYSICAL EXAM: ICD-10-CM

## 2025-02-07 DIAGNOSIS — Z13.1 SCREENING FOR DIABETES MELLITUS: ICD-10-CM

## 2025-02-07 DIAGNOSIS — N92.6 IRREGULAR MENSES: ICD-10-CM

## 2025-02-07 LAB
25(OH)D3 SERPL-MCNC: 26.9 NG/ML (ref 30–100)
ALBUMIN SERPL-MCNC: 4.4 G/DL (ref 3.5–5.2)
ALBUMIN/GLOB SERPL: 1.2 G/DL
ALP SERPL-CCNC: 128 U/L (ref 39–117)
ALT SERPL W P-5'-P-CCNC: 12 U/L (ref 1–33)
ANION GAP SERPL CALCULATED.3IONS-SCNC: 10 MMOL/L (ref 5–15)
AST SERPL-CCNC: 18 U/L (ref 1–32)
BASOPHILS # BLD AUTO: 0.02 10*3/MM3 (ref 0–0.2)
BASOPHILS NFR BLD AUTO: 0.3 % (ref 0–1.5)
BILIRUB SERPL-MCNC: 0.4 MG/DL (ref 0–1.2)
BUN SERPL-MCNC: 15 MG/DL (ref 6–20)
BUN/CREAT SERPL: 26.8 (ref 7–25)
CALCIUM SPEC-SCNC: 9.5 MG/DL (ref 8.6–10.5)
CHLORIDE SERPL-SCNC: 104 MMOL/L (ref 98–107)
CHOLEST SERPL-MCNC: 174 MG/DL (ref 0–200)
CO2 SERPL-SCNC: 24 MMOL/L (ref 22–29)
CREAT SERPL-MCNC: 0.56 MG/DL (ref 0.57–1)
DEPRECATED RDW RBC AUTO: 38 FL (ref 37–54)
EGFRCR SERPLBLD CKD-EPI 2021: 131.7 ML/MIN/1.73
EOSINOPHIL # BLD AUTO: 0.06 10*3/MM3 (ref 0–0.4)
EOSINOPHIL NFR BLD AUTO: 0.8 % (ref 0.3–6.2)
ERYTHROCYTE [DISTWIDTH] IN BLOOD BY AUTOMATED COUNT: 12.9 % (ref 12.3–15.4)
FOLATE SERPL-MCNC: >20 NG/ML (ref 4.78–24.2)
GLOBULIN UR ELPH-MCNC: 3.7 GM/DL
GLUCOSE SERPL-MCNC: 84 MG/DL (ref 65–99)
HBA1C MFR BLD: 5.5 % (ref 4.8–5.6)
HCT VFR BLD AUTO: 42 % (ref 34–46.6)
HDLC SERPL-MCNC: 54 MG/DL (ref 40–60)
HGB BLD-MCNC: 14 G/DL (ref 12–15.9)
IMM GRANULOCYTES # BLD AUTO: 0.02 10*3/MM3 (ref 0–0.05)
IMM GRANULOCYTES NFR BLD AUTO: 0.3 % (ref 0–0.5)
LDLC SERPL CALC-MCNC: 110 MG/DL (ref 0–100)
LDLC/HDLC SERPL: 2.04 {RATIO}
LYMPHOCYTES # BLD AUTO: 2.3 10*3/MM3 (ref 0.7–3.1)
LYMPHOCYTES NFR BLD AUTO: 29.4 % (ref 19.6–45.3)
MAGNESIUM SERPL-MCNC: 2.3 MG/DL (ref 1.6–2.6)
MCH RBC QN AUTO: 27.5 PG (ref 26.6–33)
MCHC RBC AUTO-ENTMCNC: 33.3 G/DL (ref 31.5–35.7)
MCV RBC AUTO: 82.5 FL (ref 79–97)
MONOCYTES # BLD AUTO: 0.44 10*3/MM3 (ref 0.1–0.9)
MONOCYTES NFR BLD AUTO: 5.6 % (ref 5–12)
NEUTROPHILS NFR BLD AUTO: 4.98 10*3/MM3 (ref 1.7–7)
NEUTROPHILS NFR BLD AUTO: 63.6 % (ref 42.7–76)
NRBC BLD AUTO-RTO: 0 /100 WBC (ref 0–0.2)
PLATELET # BLD AUTO: 358 10*3/MM3 (ref 140–450)
PMV BLD AUTO: 10.3 FL (ref 6–12)
POTASSIUM SERPL-SCNC: 4.1 MMOL/L (ref 3.5–5.2)
PROT SERPL-MCNC: 8.1 G/DL (ref 6–8.5)
RBC # BLD AUTO: 5.09 10*6/MM3 (ref 3.77–5.28)
SODIUM SERPL-SCNC: 138 MMOL/L (ref 136–145)
TRIGL SERPL-MCNC: 50 MG/DL (ref 0–150)
TSH SERPL DL<=0.05 MIU/L-ACNC: 2.52 UIU/ML (ref 0.27–4.2)
VIT B12 BLD-MCNC: 751 PG/ML (ref 211–946)
VLDLC SERPL-MCNC: 10 MG/DL (ref 5–40)
WBC NRBC COR # BLD AUTO: 7.82 10*3/MM3 (ref 3.4–10.8)

## 2025-02-07 PROCEDURE — 80061 LIPID PANEL: CPT

## 2025-02-07 PROCEDURE — 73562 X-RAY EXAM OF KNEE 3: CPT

## 2025-02-07 PROCEDURE — 73590 X-RAY EXAM OF LOWER LEG: CPT

## 2025-02-07 PROCEDURE — 36415 COLL VENOUS BLD VENIPUNCTURE: CPT

## 2025-02-07 PROCEDURE — 73502 X-RAY EXAM HIP UNI 2-3 VIEWS: CPT

## 2025-02-07 PROCEDURE — 82306 VITAMIN D 25 HYDROXY: CPT

## 2025-02-07 PROCEDURE — 82607 VITAMIN B-12: CPT

## 2025-02-07 PROCEDURE — 82746 ASSAY OF FOLIC ACID SERUM: CPT

## 2025-02-07 PROCEDURE — 83036 HEMOGLOBIN GLYCOSYLATED A1C: CPT

## 2025-02-07 PROCEDURE — 73610 X-RAY EXAM OF ANKLE: CPT

## 2025-02-07 PROCEDURE — 99395 PREV VISIT EST AGE 18-39: CPT

## 2025-02-07 PROCEDURE — 80050 GENERAL HEALTH PANEL: CPT

## 2025-02-07 PROCEDURE — 83735 ASSAY OF MAGNESIUM: CPT

## 2025-02-07 RX ORDER — VALACYCLOVIR HYDROCHLORIDE 1 G/1
TABLET, FILM COATED ORAL
Qty: 30 TABLET | Refills: 0 | Status: SHIPPED | OUTPATIENT
Start: 2025-02-07

## 2025-02-07 RX ORDER — ETONOGESTREL AND ETHINYL ESTRADIOL VAGINAL RING .015; .12 MG/D; MG/D
RING VAGINAL
Qty: 3 EACH | Refills: 4 | Status: CANCELLED | OUTPATIENT
Start: 2025-02-07

## 2025-02-07 RX ORDER — SUMATRIPTAN 50 MG/1
50 TABLET, FILM COATED ORAL ONCE AS NEEDED
Qty: 9 TABLET | Refills: 3 | Status: SHIPPED | OUTPATIENT
Start: 2025-02-07 | End: 2025-03-09

## 2025-02-07 NOTE — PROGRESS NOTES
Chief Complaint    Annual Exam  Follow-up and Annual Exam    Subjective          Lesly Bonilla is a 24 y.o. female who presents to [facility]    Annual Exam    History of Present Illness  The patient is a 24-year-old female who presents today for an annual exam and to establish care.     She has a history of HSV-1 and is currently using NuvaRing for contraception and to aid with irregular menses, with no need for refills at this time. She is also on Valtrex. Her last Pap smear was completed in 2023 and was negative for intraepithelial lesion or malignancy. The patient had blood work done in 2023. She expresses a desire to undergo blood work for preventative purposes. She takes a multivitamin supplement and reports a previous deficiency in vitamin D. Her mother has a known clotting disorder, Factor V Leiden, for which she has been tested for and is negative. She reports no issues with bowel or bladder function, chest pain, or shortness of breath. She has no history of thyroid issues.    She experiences headaches, which are more pronounced during her menstrual cycle, although they can occur at any time. These headaches are described as persistent, occurring approximately 10 days per month, with varying severity. She has been managing these headaches with ibuprofen and Tylenol, which are effective if taken early but have a delayed effect if administered later. Accompanying symptoms include light sensitivity, noise sensitivity, nausea, and dizziness. The onset of these headaches is sporadic, occasionally preceded by light sensitivity. She reports no visual aura prior to the onset of the headaches. She has been maintaining a log of her headaches.    She has been experiencing left knee and ankle pain for at least 4 years. An x-ray of her knee was performed in 2022, which yielded normal results. The pain remains consistent but tends to flare up after prolonged walking at work. She also  reports a palpable knot or bump in her left shin compared to the right. It is speculated that the pain may originate from her hip and radiate down to her ankles.    Supplemental Information  She followed with OB one time back in 2023. She still has a cyst, but it is not causing her any issues.    FAMILY HISTORY  She reports no family history of colon or breast cancer. Her aunt had a nonfatal stroke before the age of 55.     MEDICATIONS  Current: NuvaRing, Valtrex, ibuprofen, Tylenol      PHQ-2 Total Score:     PHQ-9 Total Score:          Review of Systems   Respiratory: Negative.     Cardiovascular: Negative.    Gastrointestinal: Negative.    Musculoskeletal:  Positive for arthralgias.   Neurological:  Positive for dizziness and headaches.   Psychiatric/Behavioral: Negative.            Medical History: has a past medical history of Headache (2017).     Surgical History: has a past surgical history that includes [place] tooth extraction (2022).     Family History: family history includes Cancer in her father, maternal grandmother, and mother; Clotting disorder in her mother; Diverticulitis in her father; Hypertension in her maternal grandmother; Stroke in her maternal grandfather and mother; Thyroid disease in her maternal grandmother.     Social History: reports that she has never smoked. She has never used smokeless tobacco. She reports that she does not drink alcohol and does not use drugs.    Allergies: Patient has no known allergies.      Health Maintenance Due   Topic Date Due    ANNUAL PHYSICAL  Never done            Current Outpatient Medications:     acetaminophen (TYLENOL) 500 MG tablet, Take 1 tablet by mouth Every 6 (Six) Hours As Needed for Mild Pain., Disp: , Rfl:     etonogestrel-ethinyl estradiol (NuvaRing) 0.12-0.015 MG/24HR vaginal ring, Insert vaginally and leave in place for 3 consecutive weeks, then remove for 1 week., Disp: 1 each, Rfl: 1    ibuprofen (ADVIL,MOTRIN) 200 MG tablet, Take 1 tablet  "by mouth Every 6 (Six) Hours As Needed for Mild Pain., Disp: , Rfl:     meloxicam (MOBIC) 15 MG tablet, Take 1 tablet by mouth Daily As Needed., Disp: , Rfl:     valACYclovir (Valtrex) 1000 MG tablet, Take 2 tabs (2000mg) PO then repeat dose in 12 hours x 1 day prn cold sore, Disp: 30 tablet, Rfl: 0    SUMAtriptan (Imitrex) 50 MG tablet, Take 1 tablet by mouth 1 (One) Time As Needed for Migraine for up to 30 days. Take one tablet at onset of headache. May repeat dose one time in 2 hours if headache not relieved., Disp: 9 tablet, Rfl: 3      Immunization History   Administered Date(s) Administered    COVID-19 (PFIZER) BIVALENT 12+YRS 05/11/2023    Covid-19 (Pfizer) Gray Cap Monovalent 02/22/2023, 03/15/2023    DTaP, Unspecified 01/24/2002, 03/28/2002, 05/30/2002, 12/12/2003, 08/07/2006    Hep B, Adolescent or Pediatric 01/24/2002, 03/28/2002, 05/30/2002    HiB 01/24/2002, 03/28/2002, 05/03/2002, 12/04/2002    Hpv9 04/28/2022, 12/15/2022, 05/25/2023    IPV 08/07/2006    MMR 12/04/2002, 08/07/2006    Meningococcal MCV4P (Menactra) 08/02/2018    Meningococcal Polysaccharide 04/25/2013    Pneumococcal Conjugate Unspecified 01/24/2002, 03/28/2002, 05/30/2002, 12/04/2002    Pneumococcal, Unspecified 01/24/2002, 03/28/2002, 05/30/2002, 12/04/2002    Polio, Unspecified 01/24/2002, 03/28/2002, 12/04/2002    Tdap 04/25/2013, 05/11/2023    Varicella 12/12/2003, 04/25/2013         Objective       Vitals:    02/07/25 0935   BP: 137/78   Pulse: 82   SpO2: 99%   Weight: 91.8 kg (202 lb 6.4 oz)   Height: 147.3 cm (58\")      Body mass index is 42.3 kg/m².   Wt Readings from Last 3 Encounters:   02/07/25 91.8 kg (202 lb 6.4 oz)   11/02/23 87.2 kg (192 lb 3.2 oz)   08/11/23 83.9 kg (185 lb)      BP Readings from Last 3 Encounters:   02/07/25 137/78   11/02/23 124/89   08/11/23 127/77        Class 3 Severe Obesity (BMI >=40). Obesity-related health conditions include the following: none. Obesity is newly identified. BMI is is above " average; BMI management plan is completed. We discussed portion control and increasing exercise.        Physical Exam  Neck:      Thyroid: No thyroid mass, thyromegaly or thyroid tenderness.      Vascular: No carotid bruit.   Cardiovascular:      Rate and Rhythm: Normal rate and regular rhythm.      Pulses: Normal pulses.      Heart sounds: Normal heart sounds.   Pulmonary:      Effort: Pulmonary effort is normal.      Breath sounds: Normal breath sounds.   Abdominal:      General: Abdomen is flat. Bowel sounds are normal.      Palpations: Abdomen is soft.   Neurological:      General: No focal deficit present.      Mental Status: She is oriented to person, place, and time. Mental status is at baseline.   Psychiatric:         Mood and Affect: Mood normal.         Behavior: Behavior normal.         Thought Content: Thought content normal.         Judgment: Judgment normal.         Physical Exam        Result Review :           Results  Laboratory Studies  Last Pap smear in 2023 was negative for intraepithelial lesion or malignancy.    Imaging  X-ray of left knee in 2022 was normal.                 Assessment and Plan      Assessment & Plan  1. Annual examination and establishment of care.  Her last Pap smear was conducted in 2023, yielding normal results. She is due for another Pap smear in approximately 1.5 years, unless new concerns arise. She has a history of HSV-1 and is currently using NuvaRing for contraception, with no need for refills at this time. She is also on Valtrex. A comprehensive blood work panel will be ordered, including CBC, CMP, thyroid function tests, lipid panel, hemoglobin A1c, magnesium, B12, and vitamin D levels. She will be notified of the results via RxResults. Prescriptions for NuvaRing and Valtrex will be renewed. She is advised to contact the office when a refill for NuvaRing is required.    2. Migraines.  She experiences migraines frequently, sometimes more than 10 days a month, with  symptoms including light sensitivity and dizziness. A prescription for sumatriptan will be provided, to be taken at the onset of migraine symptoms. If the pain persists, a second dose can be taken after 2 hours, with a maximum of 2 doses per day. She is advised to maintain a headache diary to monitor the frequency of her migraines. If the frequency exceeds 15 migraines per month, she should inform the office so that a preventative medication can be considered.    3. Left knee and ankle pain.  She has been experiencing left knee and ankle pain for at least 4 years. An x-ray of her knee was performed in 2022, which yielded normal results. Repeat x-rays of the left knee and ankle will be ordered to assess for any new onset of arthritis or degenerative disease.    Follow-up  The patient will follow up in 1 year for her annual physical or sooner if any concerns arise.    Diagnoses and all orders for this visit:    1. Establishing care with new doctor, encounter for (Primary)    2. Annual physical exam  -     CBC w AUTO Differential; Future  -     Comprehensive metabolic panel; Future    3. Recurrent cold sores  -     valACYclovir (Valtrex) 1000 MG tablet; Take 2 tabs (2000mg) PO then repeat dose in 12 hours x 1 day prn cold sore  Dispense: 30 tablet; Refill: 0    4. Irregular menses    5. Migraine without aura and without status migrainosus, not intractable  -     SUMAtriptan (Imitrex) 50 MG tablet; Take 1 tablet by mouth 1 (One) Time As Needed for Migraine for up to 30 days. Take one tablet at onset of headache. May repeat dose one time in 2 hours if headache not relieved.  Dispense: 9 tablet; Refill: 3  -     Magnesium; Future  -     Vitamin D 25 hydroxy; Future  -     Vitamin B12; Future  -     Folate; Future    6. Chronic pain of left knee  -     XR Knee 3 View Left; Future  -     XR Tibia Fibula 2 View Left; Future    7. Left ankle pain, unspecified chronicity  -     XR Ankle 3+ View Left; Future  -     XR Tibia  Fibula 2 View Left; Future    8. Left hip pain  -     XR Hip With or Without Pelvis 2 - 3 View Left; Future    9. Screening for thyroid disorder  -     TSH Rfx On Abnormal To Free T4    10. Screening for lipid disorders  -     Lipid panel; Future    11. Screening for diabetes mellitus  -     Hemoglobin A1c          Follow Up     Return in about 1 year (around 2/7/2026) for Annual physical.    Updated annual wellness visit checklist.  Immunizations discussed.  Screening discussed and/or ordered.  Recommend yearly dental and eye exams. Also discussed monitoring of blood pressure and lipids.      Patient was given instructions and counseling regarding her condition or for health maintenance advice. Please see specific information pulled into the AVS if appropriate.     Patient or patient representative verbalized consent for the use of Ambient Listening during the visit with  MARIJA Valdez for chart documentation. 2/7/2025  09:42 EST    MARIJA Valdez

## 2025-02-10 ENCOUNTER — TELEPHONE (OUTPATIENT)
Dept: FAMILY MEDICINE CLINIC | Facility: CLINIC | Age: 25
End: 2025-02-10
Payer: COMMERCIAL

## 2025-02-10 DIAGNOSIS — E55.9 VITAMIN D DEFICIENCY: Primary | ICD-10-CM

## 2025-02-10 DIAGNOSIS — R74.8 ELEVATED ALKALINE PHOSPHATASE LEVEL: Primary | ICD-10-CM

## 2025-02-10 DIAGNOSIS — R79.89 ELEVATED SERUM CREATININE: ICD-10-CM

## 2025-02-10 RX ORDER — ERGOCALCIFEROL 1.25 MG/1
50000 CAPSULE, LIQUID FILLED ORAL WEEKLY
Qty: 12 CAPSULE | Refills: 0 | Status: SHIPPED | OUTPATIENT
Start: 2025-02-10

## 2025-02-10 NOTE — TELEPHONE ENCOUNTER
"Relay     \"LMTCB  Next step would be physical therapy and if no relief with physical therapy then it would be additional imaging. Insurance normally does not approve additional imaging unless PT has been trialed.\"                 "

## 2025-03-20 ENCOUNTER — OFFICE VISIT (OUTPATIENT)
Dept: FAMILY MEDICINE CLINIC | Facility: CLINIC | Age: 25
End: 2025-03-20
Payer: COMMERCIAL

## 2025-03-20 ENCOUNTER — LAB (OUTPATIENT)
Dept: LAB | Facility: HOSPITAL | Age: 25
End: 2025-03-20
Payer: COMMERCIAL

## 2025-03-20 VITALS
OXYGEN SATURATION: 98 % | HEART RATE: 79 BPM | WEIGHT: 200.2 LBS | DIASTOLIC BLOOD PRESSURE: 63 MMHG | HEIGHT: 58 IN | SYSTOLIC BLOOD PRESSURE: 112 MMHG | BODY MASS INDEX: 42.02 KG/M2

## 2025-03-20 DIAGNOSIS — R79.89 ELEVATED SERUM CREATININE: ICD-10-CM

## 2025-03-20 DIAGNOSIS — M54.31 RIGHT SCIATIC NERVE PAIN: ICD-10-CM

## 2025-03-20 DIAGNOSIS — E55.9 VITAMIN D DEFICIENCY: ICD-10-CM

## 2025-03-20 DIAGNOSIS — M25.50 ARTHRALGIA, UNSPECIFIED JOINT: ICD-10-CM

## 2025-03-20 DIAGNOSIS — R74.8 ELEVATED ALKALINE PHOSPHATASE LEVEL: ICD-10-CM

## 2025-03-20 DIAGNOSIS — R74.8 ELEVATED ALKALINE PHOSPHATASE LEVEL: Primary | ICD-10-CM

## 2025-03-20 LAB
ALBUMIN SERPL-MCNC: 4.2 G/DL (ref 3.5–5.2)
ANION GAP SERPL CALCULATED.3IONS-SCNC: 12.2 MMOL/L (ref 5–15)
BUN SERPL-MCNC: 17 MG/DL (ref 6–20)
BUN/CREAT SERPL: 22.4 (ref 7–25)
CALCIUM SPEC-SCNC: 10.1 MG/DL (ref 8.6–10.5)
CHLORIDE SERPL-SCNC: 104 MMOL/L (ref 98–107)
CHROMATIN AB SERPL-ACNC: <10 IU/ML (ref 0–14)
CK SERPL-CCNC: 42 U/L (ref 20–180)
CO2 SERPL-SCNC: 23.8 MMOL/L (ref 22–29)
CREAT SERPL-MCNC: 0.76 MG/DL (ref 0.57–1)
CRP SERPL-MCNC: 2.57 MG/DL (ref 0–0.5)
EGFRCR SERPLBLD CKD-EPI 2021: 113.1 ML/MIN/1.73
ERYTHROCYTE [SEDIMENTATION RATE] IN BLOOD: 30 MM/HR (ref 0–20)
GGT SERPL-CCNC: 24 U/L (ref 5–36)
GLUCOSE SERPL-MCNC: 86 MG/DL (ref 65–99)
PHOSPHATE SERPL-MCNC: 3 MG/DL (ref 2.5–4.5)
POTASSIUM SERPL-SCNC: 4 MMOL/L (ref 3.5–5.2)
SODIUM SERPL-SCNC: 140 MMOL/L (ref 136–145)

## 2025-03-20 PROCEDURE — 84080 ASSAY ALKALINE PHOSPHATASES: CPT

## 2025-03-20 PROCEDURE — 86200 CCP ANTIBODY: CPT

## 2025-03-20 PROCEDURE — 86038 ANTINUCLEAR ANTIBODIES: CPT

## 2025-03-20 PROCEDURE — 81374 HLA I TYPING 1 ANTIGEN LR: CPT

## 2025-03-20 PROCEDURE — 82550 ASSAY OF CK (CPK): CPT

## 2025-03-20 PROCEDURE — 86431 RHEUMATOID FACTOR QUANT: CPT

## 2025-03-20 PROCEDURE — 86140 C-REACTIVE PROTEIN: CPT

## 2025-03-20 PROCEDURE — 36415 COLL VENOUS BLD VENIPUNCTURE: CPT

## 2025-03-20 PROCEDURE — 85652 RBC SED RATE AUTOMATED: CPT

## 2025-03-20 PROCEDURE — 82977 ASSAY OF GGT: CPT

## 2025-03-20 PROCEDURE — 80069 RENAL FUNCTION PANEL: CPT

## 2025-03-20 PROCEDURE — 84075 ASSAY ALKALINE PHOSPHATASE: CPT

## 2025-03-20 RX ORDER — CHOLECALCIFEROL (VITAMIN D3) 50 MCG
2000 TABLET ORAL DAILY
Qty: 90 TABLET | Refills: 3 | Status: SHIPPED | OUTPATIENT
Start: 2025-03-20 | End: 2026-03-20

## 2025-03-20 NOTE — PROGRESS NOTES
Chief Complaint   Patient presents with    follow up      Recent labs     Vitamin D Deficiency        Subjective     Lesly Bonilla  has a past medical history of Headache (2017).    HPI    History of Present Illness  The patient is a 24-year-old female who presents today to discuss lab work.    She has been experiencing bone aches and knee pain, which have raised concerns about potential liver or bone issues. She has not undergone any autoimmune workup for conditions such as lupus or rheumatoid arthritis. She consulted a podiatrist and an orthopedist in the past, who suggested the possibility of arthritis, leading to her prescription of meloxicam.    On Sunday, she experienced sharp, unilateral buttock pain while standing and walking at work, but it did not radiate down her leg. This pain was intermittent throughout the day. She also reported severe right-sided sciatica during her first pregnancy, which persisted postpartum. The pain extended to her feet. She took leave from work on Monday, Tuesday, and Wednesday due to the pain.    She has been making efforts to increase her water intake. Approximately a week after being informed of her dehydration, she noticed dryness in her feet.    She reports that her migraines have been well-managed, with no recent severe episodes.    She attempted to manage her low vitamin D levels with supplementation but discontinued after two weeks due to adverse effects, including fatigue. She was also taking multivitamins containing vitamin D but ceased their use to observe any changes.    MEDICATIONS  Discontinued: Meloxicam      No Known Allergies      Current Outpatient Medications:     acetaminophen (TYLENOL) 500 MG tablet, Take 1 tablet by mouth Every 6 (Six) Hours As Needed for Mild Pain., Disp: , Rfl:     etonogestrel-ethinyl estradiol (NuvaRing) 0.12-0.015 MG/24HR vaginal ring, Insert vaginally and leave in place for 3 consecutive weeks, then remove for 1 week., Disp: 1 each,  Rfl: 1    ibuprofen (ADVIL,MOTRIN) 200 MG tablet, Take 1 tablet by mouth Every 6 (Six) Hours As Needed for Mild Pain., Disp: , Rfl:     meloxicam (MOBIC) 15 MG tablet, Take 1 tablet by mouth Daily As Needed., Disp: , Rfl:     SUMAtriptan (Imitrex) 50 MG tablet, Take 1 tablet by mouth 1 (One) Time As Needed for Migraine for up to 30 days. Take one tablet at onset of headache. May repeat dose one time in 2 hours if headache not relieved., Disp: 9 tablet, Rfl: 3    valACYclovir (Valtrex) 1000 MG tablet, Take 2 tabs (2000mg) PO then repeat dose in 12 hours x 1 day prn cold sore, Disp: 30 tablet, Rfl: 0    Cholecalciferol (Vitamin D) 50 MCG (2000 UT) tablet, Take 1 tablet by mouth Daily., Disp: 90 tablet, Rfl: 3    There is no problem list on file for this patient.       Past Surgical History:   Procedure Laterality Date    WISDOM TOOTH EXTRACTION  2022       Social History     Socioeconomic History    Marital status: Single   Tobacco Use    Smoking status: Never    Smokeless tobacco: Never   Vaping Use    Vaping status: Never Used   Substance and Sexual Activity    Alcohol use: Never    Drug use: Never    Sexual activity: Never     Birth control/protection: Vaginal contraceptive ring     Comment: Using Nuvaring to help regulate cycles only, she is not sexually active at this time       Family History   Problem Relation Age of Onset    Cancer Father     Diverticulitis Father     Cancer Mother     Stroke Mother     Clotting disorder Mother     Thyroid disease Maternal Grandmother     Hypertension Maternal Grandmother     Cancer Maternal Grandmother     Stroke Maternal Grandfather        Family history, surgical history, past medical history, Allergies and med's reviewed with patient today and updated in [facility] EMR.     ROS:  Review of Systems   Constitutional:  Negative for chills, diaphoresis, fatigue and fever.   HENT:  Negative for congestion, sore throat and swollen glands.    Respiratory:  Negative for cough.   "  Cardiovascular:  Negative for chest pain.   Gastrointestinal:  Negative for abdominal pain, nausea and vomiting.   Genitourinary:  Negative for dysuria.   Musculoskeletal:  Positive for arthralgias. Negative for myalgias and neck pain.   Skin:  Negative for rash.   Neurological:  Negative for weakness and numbness.       OBJECTIVE:  Vitals:    03/20/25 0949   BP: 112/63   Pulse: 79   SpO2: 98%   Weight: 90.8 kg (200 lb 3.2 oz)   Height: 147.3 cm (58\")     Body mass index is 41.84 kg/m².  No LMP recorded.    Physical Exam  Cardiovascular:      Rate and Rhythm: Normal rate.   Pulmonary:      Effort: Pulmonary effort is normal.   Neurological:      General: No focal deficit present.      Mental Status: She is oriented to person, place, and time. Mental status is at baseline.   Psychiatric:         Mood and Affect: Mood normal.         Behavior: Behavior normal.         Thought Content: Thought content normal.         Judgment: Judgment normal.         Physical Exam      Procedures            Health Maintenance Due   Topic Date Due    MENINGOCOCCAL B VACCINE (1 of 2 - Standard) Never done        No visits with results within 30 Day(s) from this visit.   Latest known visit with results is:   Lab on 02/07/2025   Component Date Value Ref Range Status    Glucose 02/07/2025 84  65 - 99 mg/dL Final    BUN 02/07/2025 15  6 - 20 mg/dL Final    Creatinine 02/07/2025 0.56 (L)  0.57 - 1.00 mg/dL Final    Sodium 02/07/2025 138  136 - 145 mmol/L Final    Potassium 02/07/2025 4.1  3.5 - 5.2 mmol/L Final    Chloride 02/07/2025 104  98 - 107 mmol/L Final    CO2 02/07/2025 24.0  22.0 - 29.0 mmol/L Final    Calcium 02/07/2025 9.5  8.6 - 10.5 mg/dL Final    Total Protein 02/07/2025 8.1  6.0 - 8.5 g/dL Final    Albumin 02/07/2025 4.4  3.5 - 5.2 g/dL Final    ALT (SGPT) 02/07/2025 12  1 - 33 U/L Final    AST (SGOT) 02/07/2025 18  1 - 32 U/L Final    Alkaline Phosphatase 02/07/2025 128 (H)  39 - 117 U/L Final    Total Bilirubin 02/07/2025 " 0.4  0.0 - 1.2 mg/dL Final    Globulin 02/07/2025 3.7  gm/dL Final    A/G Ratio 02/07/2025 1.2  g/dL Final    BUN/Creatinine Ratio 02/07/2025 26.8 (H)  7.0 - 25.0 Final    Anion Gap 02/07/2025 10.0  5.0 - 15.0 mmol/L Final    eGFR 02/07/2025 131.7  >60.0 mL/min/1.73 Final    Total Cholesterol 02/07/2025 174  0 - 200 mg/dL Final    Triglycerides 02/07/2025 50  0 - 150 mg/dL Final    HDL Cholesterol 02/07/2025 54  40 - 60 mg/dL Final    LDL Cholesterol  02/07/2025 110 (H)  0 - 100 mg/dL Final    VLDL Cholesterol 02/07/2025 10  5 - 40 mg/dL Final    LDL/HDL Ratio 02/07/2025 2.04   Final    Magnesium 02/07/2025 2.3  1.6 - 2.6 mg/dL Final    25 Hydroxy, Vitamin D 02/07/2025 26.9 (L)  30.0 - 100.0 ng/ml Final    Vitamin B-12 02/07/2025 751  211 - 946 pg/mL Final    Folate 02/07/2025 >20.00  4.78 - 24.20 ng/mL Final    WBC 02/07/2025 7.82  3.40 - 10.80 10*3/mm3 Final    RBC 02/07/2025 5.09  3.77 - 5.28 10*6/mm3 Final    Hemoglobin 02/07/2025 14.0  12.0 - 15.9 g/dL Final    Hematocrit 02/07/2025 42.0  34.0 - 46.6 % Final    MCV 02/07/2025 82.5  79.0 - 97.0 fL Final    MCH 02/07/2025 27.5  26.6 - 33.0 pg Final    MCHC 02/07/2025 33.3  31.5 - 35.7 g/dL Final    RDW 02/07/2025 12.9  12.3 - 15.4 % Final    RDW-SD 02/07/2025 38.0  37.0 - 54.0 fl Final    MPV 02/07/2025 10.3  6.0 - 12.0 fL Final    Platelets 02/07/2025 358  140 - 450 10*3/mm3 Final    Neutrophil % 02/07/2025 63.6  42.7 - 76.0 % Final    Lymphocyte % 02/07/2025 29.4  19.6 - 45.3 % Final    Monocyte % 02/07/2025 5.6  5.0 - 12.0 % Final    Eosinophil % 02/07/2025 0.8  0.3 - 6.2 % Final    Basophil % 02/07/2025 0.3  0.0 - 1.5 % Final    Immature Grans % 02/07/2025 0.3  0.0 - 0.5 % Final    Neutrophils, Absolute 02/07/2025 4.98  1.70 - 7.00 10*3/mm3 Final    Lymphocytes, Absolute 02/07/2025 2.30  0.70 - 3.10 10*3/mm3 Final    Monocytes, Absolute 02/07/2025 0.44  0.10 - 0.90 10*3/mm3 Final    Eosinophils, Absolute 02/07/2025 0.06  0.00 - 0.40 10*3/mm3 Final     Basophils, Absolute 02/07/2025 0.02  0.00 - 0.20 10*3/mm3 Final    Immature Grans, Absolute 02/07/2025 0.02  0.00 - 0.05 10*3/mm3 Final    nRBC 02/07/2025 0.0  0.0 - 0.2 /100 WBC Final       Results  Laboratory Studies  Thyroid levels were normal. No signs of diabetes or prediabetes. White blood cell, red blood cells were all normal. Alkaline phosphatase was elevated. LDL cholesterol was slightly above normal. Creatinine was slightly elevated. Vitamin D was low.      ASSESSMENT/ PLAN:    Diagnoses and all orders for this visit:    1. Elevated alkaline phosphatase level (Primary)  -     Gamma GT; Future    2. Vitamin D deficiency  -     Cholecalciferol (Vitamin D) 50 MCG (2000 UT) tablet; Take 1 tablet by mouth Daily.  Dispense: 90 tablet; Refill: 3    3. Right sciatic nerve pain  Comments:  Alternate heat and ice, warm bath, stretching exercises, and discussed possible need for steroid burst if persists.    4. Arthralgia, unspecified joint  -     KIM Direct Reflex to 11 Biomarker; Future  -     Cyclic citrul peptide antibody, IgG/IgA; Future  -     CK; Future  -     C-reactive protein; Future  -     Rheumatoid Factor; Future  -     Sedimentation rate; Future  -     HLA-B27 Antigen; Future        Assessment & Plan  1. Elevated alkaline phosphatase.  The elevated alkaline phosphatase could be due to fatty liver or bone breakdown. Follow-up labs, including a gamma GT test, will be conducted to differentiate between liver and bone causes.    2. Low vitamin D.  She reported feeling unwell and exhausted while taking high-dose vitamin D (50,000 units weekly). She will switch to a daily dose of 2000 units to see if it alleviates her symptoms.    3. Sciatic nerve pain.  She experienced sharp pain in her buttock, likely due to sciatic nerve irritation from prolonged standing and walking. If the pain becomes persistent and significantly impacts her daily life, a short course of steroids may be considered. In the meantime, she  is advised to apply heat and ice alternately at home and take baths to help alleviate the pain. If the pain becomes very bothersome or impacts her day-to-day life, physical therapy may be considered.    4. Joint pain.  Given her age and the extent of her joint pain, an autoimmune workup will be conducted to rule out conditions such as lupus and rheumatoid arthritis. If any tests return positive, a referral to a rheumatologist will be made.    5. Slightly elevated creatinine.  Her creatinine levels were slightly elevated, possibly due to dehydration. She has been advised to increase her water intake. A recheck of her kidney function will be done to ensure normalization.    Follow-up  The patient will follow up based on the results of the lab work.    Orders Placed Today:     New Medications Ordered This Visit   Medications    Cholecalciferol (Vitamin D) 50 MCG (2000 UT) tablet     Sig: Take 1 tablet by mouth Daily.     Dispense:  90 tablet     Refill:  3        Management Plan:     An After Visit Summary was printed and given to the patient at discharge.    Follow-up: No follow-ups on file.    Patient or patient representative verbalized consent for the use of Ambient Listening during the visit with  MARIJA Valdez for chart documentation. 3/20/2025  10:20 EDT    MARIJA Valdez 3/20/2025 10:30 EDT  This note was electronically signed.

## 2025-03-22 LAB — CCP IGA+IGG SERPL IA-ACNC: 5 UNITS (ref 0–19)

## 2025-03-24 ENCOUNTER — RESULTS FOLLOW-UP (OUTPATIENT)
Dept: FAMILY MEDICINE CLINIC | Facility: CLINIC | Age: 25
End: 2025-03-24
Payer: COMMERCIAL

## 2025-03-24 LAB
ALP BONE CFR SERPL: 18 % (ref 14–68)
ALP INTEST CFR SERPL: 0 % (ref 0–18)
ALP LIVER CFR SERPL: 82 % (ref 18–85)
ALP SERPL-CCNC: 132 IU/L (ref 44–121)
ANA SER QL: NEGATIVE

## 2025-03-27 LAB — HLA-B27 QL NAA+PROBE: NEGATIVE

## 2025-05-09 ENCOUNTER — OFFICE VISIT (OUTPATIENT)
Dept: FAMILY MEDICINE CLINIC | Facility: CLINIC | Age: 25
End: 2025-05-09
Payer: COMMERCIAL

## 2025-05-09 VITALS
HEIGHT: 58 IN | SYSTOLIC BLOOD PRESSURE: 140 MMHG | BODY MASS INDEX: 42.74 KG/M2 | HEART RATE: 81 BPM | OXYGEN SATURATION: 99 % | DIASTOLIC BLOOD PRESSURE: 95 MMHG | WEIGHT: 203.6 LBS

## 2025-05-09 DIAGNOSIS — E55.9 VITAMIN D DEFICIENCY: ICD-10-CM

## 2025-05-09 DIAGNOSIS — N92.6 IRREGULAR MENSES: ICD-10-CM

## 2025-05-09 RX ORDER — CHOLECALCIFEROL (VITAMIN D3) 50 MCG
2000 TABLET ORAL DAILY
Qty: 90 TABLET | Refills: 3 | Status: SHIPPED | OUTPATIENT
Start: 2025-05-09 | End: 2026-05-09

## 2025-05-09 RX ORDER — ETONOGESTREL AND ETHINYL ESTRADIOL VAGINAL RING .015; .12 MG/D; MG/D
RING VAGINAL
Qty: 1 EACH | Refills: 5 | Status: SHIPPED | OUTPATIENT
Start: 2025-05-09

## 2025-05-09 NOTE — PROGRESS NOTES
Chief Complaint   Patient presents with    Follow-up     Needs a new prescription for the Nuva Ring   Lab results         Subjective     Lesly Bonilla  has a past medical history of Headache (2017).    HPI    History of Present Illness  The patient is a 24-year-old female who presents today to discuss NuvaRing and lab results.    She is seeking a new prescription for NuvaRing, which she has been using effectively as a contraceptive method. She is planning a trip to [place] on 06/23/2025 and will be away for approximately 2 weeks. Additionally, she expressed interest in discussing her lab results, particularly her vitamin D levels, which have been low. She has been on vitamin D supplementation for the past 3 months and requires a refill earlier than anticipated due to accidental spillage of some tablets.    She has been experiencing an increase in headaches, with three episodes reported last week. The frequency of these headaches varies, with some weeks being symptom-free while others may have multiple episodes. She suspects a possible correlation between her menstrual cycle and the onset of these headaches, as her last cycle started a week earlier than expected. She has only experienced one migraine episode, during which she took sumatriptan. However, she is uncertain if the medication exacerbated her symptoms. She typically manages her headaches with ibuprofen, which provides relief.    Her work had another doctor type place, [employer], inquire about her knee, prompting a virtual visit with a rheumatologist scheduled for next week Thursday. She plans to pursue this consultation and, if necessary, follow up with an in-person visit. She has not dealt with multiple broken bones growing up.    No Known Allergies      Current Outpatient Medications:     acetaminophen (TYLENOL) 500 MG tablet, Take 1 tablet by mouth Every 6 (Six) Hours As Needed for Mild Pain., Disp: , Rfl:     Cholecalciferol (Vitamin D) 50 MCG (2000  UT) tablet, Take 1 tablet by mouth Daily., Disp: 90 tablet, Rfl: 3    etonogestrel-ethinyl estradiol (NuvaRing) 0.12-0.015 MG/24HR vaginal ring, Insert vaginally and leave in place for 3 consecutive weeks, then remove for 1 week., Disp: 1 each, Rfl: 5    ibuprofen (ADVIL,MOTRIN) 200 MG tablet, Take 1 tablet by mouth Every 6 (Six) Hours As Needed for Mild Pain., Disp: , Rfl:     meloxicam (MOBIC) 15 MG tablet, Take 1 tablet by mouth Daily As Needed., Disp: , Rfl:     valACYclovir (Valtrex) 1000 MG tablet, Take 2 tabs (2000mg) PO then repeat dose in 12 hours x 1 day prn cold sore, Disp: 30 tablet, Rfl: 0    SUMAtriptan (Imitrex) 50 MG tablet, Take 1 tablet by mouth 1 (One) Time As Needed for Migraine for up to 30 days. Take one tablet at onset of headache. May repeat dose one time in 2 hours if headache not relieved., Disp: 9 tablet, Rfl: 3    There is no problem list on file for this patient.       Past Surgical History:   Procedure Laterality Date    WISDOM TOOTH EXTRACTION  2022       Social History     Socioeconomic History    Marital status: Single   Tobacco Use    Smoking status: Never    Smokeless tobacco: Never   Vaping Use    Vaping status: Never Used   Substance and Sexual Activity    Alcohol use: Yes     Alcohol/week: 4.0 standard drinks of alcohol     Types: 4 Drinks containing 0.5 oz of alcohol per week     Comment: Only every other weekend at most. Sometimes not that much    Drug use: Never    Sexual activity: Never     Birth control/protection: Vaginal contraceptive ring     Comment: Using Nuvaring to help regulate cycles only, she is not sexually active at this time       Family History   Problem Relation Age of Onset    Cancer Father     Diverticulitis Father     Cancer Mother     Stroke Mother     Clotting disorder Mother     Thyroid disease Maternal Grandmother     Hypertension Maternal Grandmother     Cancer Maternal Grandmother     Stroke Maternal Grandfather        Family history, surgical  "history, past medical history, Allergies and med's reviewed with patient today and updated in [facility] EMR.     ROS:  Review of Systems   Musculoskeletal:  Positive for arthralgias.   All other systems reviewed and are negative.      OBJECTIVE:  Vitals:    05/09/25 1511   BP: 140/95   Pulse: 81   SpO2: 99%   Weight: 92.4 kg (203 lb 9.6 oz)   Height: 147.3 cm (57.99\")     Body mass index is 42.56 kg/m².  No LMP recorded.    Physical Exam  Cardiovascular:      Rate and Rhythm: Normal rate.   Pulmonary:      Effort: Pulmonary effort is normal.   Neurological:      General: No focal deficit present.      Mental Status: She is oriented to person, place, and time. Mental status is at baseline.   Psychiatric:         Mood and Affect: Mood normal.         Behavior: Behavior normal.         Thought Content: Thought content normal.         Judgment: Judgment normal.         Physical Exam      Procedures            There are no preventive care reminders to display for this patient.       No visits with results within 30 Day(s) from this visit.   Latest known visit with results is:   Lab on 03/20/2025   Component Date Value Ref Range Status    Alkaline Phosphatase 03/20/2025 132 (H)  44 - 121 IU/L Final    Liver Fraction: 03/20/2025 82  18 - 85 % Final    Bone Fraction: 03/20/2025 18  14 - 68 % Final    Intestinal Frac.: 03/20/2025 0  0 - 18 % Final    Glucose 03/20/2025 86  65 - 99 mg/dL Final    BUN 03/20/2025 17  6 - 20 mg/dL Final    Creatinine 03/20/2025 0.76  0.57 - 1.00 mg/dL Final    Sodium 03/20/2025 140  136 - 145 mmol/L Final    Potassium 03/20/2025 4.0  3.5 - 5.2 mmol/L Final    Chloride 03/20/2025 104  98 - 107 mmol/L Final    CO2 03/20/2025 23.8  22.0 - 29.0 mmol/L Final    Calcium 03/20/2025 10.1  8.6 - 10.5 mg/dL Final    Albumin 03/20/2025 4.2  3.5 - 5.2 g/dL Final    Phosphorus 03/20/2025 3.0  2.5 - 4.5 mg/dL Final    Anion Gap 03/20/2025 12.2  5.0 - 15.0 mmol/L Final    BUN/Creatinine Ratio 03/20/2025 22.4  7.0 - " 25.0 Final    eGFR 03/20/2025 113.1  >60.0 mL/min/1.73 Final    GGT 03/20/2025 24  5 - 36 U/L Final    KIM Direct 03/20/2025 Negative  Negative Final    CCP Antibodies IgG/IgA 03/20/2025 5  0 - 19 units Final                              Negative               <20                            Weak positive      20 - 39                            Moderate positive  40 - 59                            Strong positive        >59    Creatine Kinase 03/20/2025 42  20 - 180 U/L Final    C-Reactive Protein 03/20/2025 2.57 (H)  0.00 - 0.50 mg/dL Final    Rheumatoid Factor Quantitative 03/20/2025 <10.0  0.0 - 14.0 IU/mL Final    Sed Rate 03/20/2025 30 (H)  0 - 20 mm/hr Final    HLA B27 03/20/2025 Negative   Final    HLA-B*27 Negative  B27 allele interpretation for all loci based on IMGT/HLA  database version 3.51.0  This test was developed and its performance characteristics  determined by [facility].  It has not been cleared or approved  by the Food and Drug Administration.  The FDA has determined that such clearance or approval is  not necessary.  [facility] CLIA ID Number [id number]  This test was performed using Polymerase Chain Reaction (PCR) and  Sequence Specific Oligonucleotide Probes (SSOP) technique. Sequence  Based Typing (SBT) may be used as a supplemental method when  necessary.  If you have questions, please call [facility] customer service at  [phone] or email at [email or web address].       Results  Labs   - Sedimentation rate: Elevated   - CRP: Elevated   - Alkaline phosphatase: Elevated   - Vitamin D: Low   - Calcium: Normal      ASSESSMENT/ PLAN:    Diagnoses and all orders for this visit:    1. Irregular menses  Comments:  NuvaRing prescription has been renewed and patient will return within 3 months to have Pap and annual physical  Orders:  -     etonogestrel-ethinyl estradiol (NuvaRing) 0.12-0.015 MG/24HR vaginal ring; Insert vaginally and leave in place for 3 consecutive weeks, then remove for 1 week.  Dispense: 1  each; Refill: 5    2. Vitamin D deficiency  -     Cholecalciferol (Vitamin D) 50 MCG (2000 UT) tablet; Take 1 tablet by mouth Daily.  Dispense: 90 tablet; Refill: 3  -     Vitamin D 25 hydroxy; Future  -     Comprehensive metabolic panel; Future        Assessment & Plan  1. Contraception management.  - The patient requested a new prescription for NuvaRing.  - A prescription for NuvaRing sufficient for a 6-month duration will be provided.  - The patient confirmed that NuvaRing works well for her.  - The prescription will be sent to the pharmacy.    2. Elevated alkaline phosphatase.  - GGT levels are within the normal range, but alkaline phosphatase levels are elevated.  - Vitamin D levels are low, but calcium levels are normal.  - A recheck of vitamin D levels will be ordered, along with a recheck of CMP to ensure calcium levels remain stable and to assess the current status of alkaline phosphatase levels.  - A refill of the vitamin D prescription will be provided.    3. Headaches.  - The patient reported an increase in headaches, with 3 episodes last week.  - She suspects a possible correlation between her menstrual cycle and the onset of these headaches.  - She has experienced one migraine episode and took sumatriptan, but is uncertain if it exacerbated her symptoms.  - She typically manages her headaches with ibuprofen, which provides relief. She is advised to maintain a headache log.    4. Inflammatory markers.  - Sedimentation rate and CRP were elevated, while the rest of the autoimmune profile came back negative.  - The patient has a virtual visit with a rheumatologist scheduled for next week.  - She is advised to inform the rheumatologist about her elevated sedimentation rate and CRP.    Follow-up  The patient will follow up in 3 months or sooner if necessary.    Orders Placed Today:     New Medications Ordered This Visit   Medications    etonogestrel-ethinyl estradiol (NuvaRing) 0.12-0.015 MG/24HR vaginal  ring     Sig: Insert vaginally and leave in place for 3 consecutive weeks, then remove for 1 week.     Dispense:  1 each     Refill:  5    Cholecalciferol (Vitamin D) 50 MCG (2000 UT) tablet     Sig: Take 1 tablet by mouth Daily.     Dispense:  90 tablet     Refill:  3        Management Plan:     An After Visit Summary was printed and given to the patient at discharge.    Follow-up: Return in about 3 months (around 8/9/2025) for elevated alk phos.    Patient or patient representative verbalized consent for the use of Ambient Listening during the visit with  MARIJA Valdez for chart documentation. 5/9/2025  15:29 EDT    MARIJA Valdez 5/9/2025 16:00 EDT  This note was electronically signed.

## 2025-05-15 DIAGNOSIS — M25.50 ARTHRALGIA, UNSPECIFIED JOINT: ICD-10-CM

## 2025-05-15 DIAGNOSIS — M25.572 LEFT ANKLE PAIN, UNSPECIFIED CHRONICITY: ICD-10-CM

## 2025-05-15 DIAGNOSIS — M25.562 CHRONIC PAIN OF LEFT KNEE: Primary | ICD-10-CM

## 2025-05-15 DIAGNOSIS — M25.562 CHRONIC PAIN OF LEFT KNEE: ICD-10-CM

## 2025-05-15 DIAGNOSIS — M54.31 RIGHT SCIATIC NERVE PAIN: Primary | ICD-10-CM

## 2025-05-15 DIAGNOSIS — M25.552 LEFT HIP PAIN: ICD-10-CM

## 2025-05-15 DIAGNOSIS — G89.29 CHRONIC PAIN OF LEFT KNEE: Primary | ICD-10-CM

## 2025-05-15 DIAGNOSIS — G89.29 CHRONIC PAIN OF LEFT KNEE: ICD-10-CM

## 2025-06-05 ENCOUNTER — OFFICE VISIT (OUTPATIENT)
Dept: FAMILY MEDICINE CLINIC | Facility: CLINIC | Age: 25
End: 2025-06-05
Payer: COMMERCIAL

## 2025-06-05 ENCOUNTER — LAB (OUTPATIENT)
Dept: LAB | Facility: HOSPITAL | Age: 25
End: 2025-06-05
Payer: COMMERCIAL

## 2025-06-05 VITALS
HEIGHT: 58 IN | WEIGHT: 199.4 LBS | DIASTOLIC BLOOD PRESSURE: 80 MMHG | OXYGEN SATURATION: 98 % | TEMPERATURE: 98 F | SYSTOLIC BLOOD PRESSURE: 127 MMHG | BODY MASS INDEX: 41.86 KG/M2 | HEART RATE: 90 BPM

## 2025-06-05 DIAGNOSIS — E55.9 VITAMIN D DEFICIENCY: Primary | ICD-10-CM

## 2025-06-05 DIAGNOSIS — E55.9 VITAMIN D DEFICIENCY: ICD-10-CM

## 2025-06-05 DIAGNOSIS — R74.8 ELEVATED ALKALINE PHOSPHATASE LEVEL: ICD-10-CM

## 2025-06-05 LAB
25(OH)D3 SERPL-MCNC: 50.5 NG/ML (ref 30–100)
ALBUMIN SERPL-MCNC: 4.1 G/DL (ref 3.5–5.2)
ALBUMIN/GLOB SERPL: 1.1 G/DL
ALP SERPL-CCNC: 89 U/L (ref 39–117)
ALT SERPL W P-5'-P-CCNC: 14 U/L (ref 1–33)
ANION GAP SERPL CALCULATED.3IONS-SCNC: 11.5 MMOL/L (ref 5–15)
AST SERPL-CCNC: 19 U/L (ref 1–32)
BILIRUB SERPL-MCNC: 0.3 MG/DL (ref 0–1.2)
BUN SERPL-MCNC: 18 MG/DL (ref 6–20)
BUN/CREAT SERPL: 22.2 (ref 7–25)
CALCIUM SPEC-SCNC: 10.1 MG/DL (ref 8.6–10.5)
CHLORIDE SERPL-SCNC: 107 MMOL/L (ref 98–107)
CO2 SERPL-SCNC: 20.5 MMOL/L (ref 22–29)
CREAT SERPL-MCNC: 0.81 MG/DL (ref 0.57–1)
EGFRCR SERPLBLD CKD-EPI 2021: 104.8 ML/MIN/1.73
GLOBULIN UR ELPH-MCNC: 3.9 GM/DL
GLUCOSE SERPL-MCNC: 99 MG/DL (ref 65–99)
POTASSIUM SERPL-SCNC: 4.8 MMOL/L (ref 3.5–5.2)
PROT SERPL-MCNC: 8 G/DL (ref 6–8.5)
SODIUM SERPL-SCNC: 139 MMOL/L (ref 136–145)

## 2025-06-05 PROCEDURE — 36415 COLL VENOUS BLD VENIPUNCTURE: CPT

## 2025-06-05 PROCEDURE — 80053 COMPREHEN METABOLIC PANEL: CPT

## 2025-06-05 PROCEDURE — 82306 VITAMIN D 25 HYDROXY: CPT

## 2025-06-05 NOTE — PROGRESS NOTES
Chief Complaint   Patient presents with    Annual Exam    Gynecologic Exam     Also wants tested for STD    Mass     Bump on left labia minora         Subjective     Lesly Bonilla  has a past medical history of Headache (2017).    HPI    History of Present Illness  Patient is a 24-year-old female who initially presented today for a well woman, but wants to delay her well woman due to being on her menses.    Patient has no other complaints today.  States she gets an MRI next month and starts PT next week to hopefully help us get answers for her pain she is having.    Will be getting her blood work today to check an updated vitamin D and CMP.    No Known Allergies      Current Outpatient Medications:     acetaminophen (TYLENOL) 500 MG tablet, Take 1 tablet by mouth Every 6 (Six) Hours As Needed for Mild Pain., Disp: , Rfl:     Cholecalciferol (Vitamin D) 50 MCG (2000 UT) tablet, Take 1 tablet by mouth Daily., Disp: 90 tablet, Rfl: 3    etonogestrel-ethinyl estradiol (NuvaRing) 0.12-0.015 MG/24HR vaginal ring, Insert vaginally and leave in place for 3 consecutive weeks, then remove for 1 week., Disp: 1 each, Rfl: 5    ibuprofen (ADVIL,MOTRIN) 200 MG tablet, Take 1 tablet by mouth Every 6 (Six) Hours As Needed for Mild Pain., Disp: , Rfl:     valACYclovir (Valtrex) 1000 MG tablet, Take 2 tabs (2000mg) PO then repeat dose in 12 hours x 1 day prn cold sore, Disp: 30 tablet, Rfl: 0    meloxicam (MOBIC) 15 MG tablet, Take 1 tablet by mouth Daily As Needed. (Patient not taking: Reported on 6/5/2025), Disp: , Rfl:     SUMAtriptan (Imitrex) 50 MG tablet, Take 1 tablet by mouth 1 (One) Time As Needed for Migraine for up to 30 days. Take one tablet at onset of headache. May repeat dose one time in 2 hours if headache not relieved., Disp: 9 tablet, Rfl: 3    There is no problem list on file for this patient.       Past Surgical History:   Procedure Laterality Date    WISDOM TOOTH EXTRACTION  2022       Social History  "    Socioeconomic History    Marital status: Single   Tobacco Use    Smoking status: Never    Smokeless tobacco: Never   Vaping Use    Vaping status: Never Used   Substance and Sexual Activity    Alcohol use: Yes     Alcohol/week: 4.0 standard drinks of alcohol     Types: 4 Drinks containing 0.5 oz of alcohol per week     Comment: Only every other weekend at most. Sometimes not that much    Drug use: Never    Sexual activity: Yes     Partners: Male     Birth control/protection: Condom, Vaginal contraceptive ring     Comment: Using Nuvaring to help regulate cycles only, she is not sexually active at this time       Family History   Problem Relation Age of Onset    Cancer Father     Diverticulitis Father     Cancer Mother     Stroke Mother     Clotting disorder Mother     Thyroid disease Maternal Grandmother     Hypertension Maternal Grandmother     Cancer Maternal Grandmother     Stroke Maternal Grandfather        Family history, surgical history, past medical history, Allergies and med's reviewed with patient today and updated in [email or web address] EMR.     ROS:  Review of Systems   All other systems reviewed and are negative.      OBJECTIVE:  Vitals:    06/05/25 0749   BP: 127/80   Pulse: 90   Temp: 98 °F (36.7 °C)   TempSrc: Oral   SpO2: 98%   Weight: 90.4 kg (199 lb 6.4 oz)   Height: 147.3 cm (57.99\")     Body mass index is 41.69 kg/m².  No LMP recorded.    Physical Exam  Cardiovascular:      Rate and Rhythm: Normal rate.   Pulmonary:      Effort: Pulmonary effort is normal.   Neurological:      General: No focal deficit present.      Mental Status: She is oriented to person, place, and time. Mental status is at baseline.   Psychiatric:         Mood and Affect: Mood normal.         Behavior: Behavior normal.         Thought Content: Thought content normal.         Judgment: Judgment normal.         Physical Exam      Procedures            Health Maintenance Due   Topic Date Due    CHLAMYDIA SCREENING  04/28/2023        No " visits with results within 30 Day(s) from this visit.   Latest known visit with results is:   Lab on 03/20/2025   Component Date Value Ref Range Status    Alkaline Phosphatase 03/20/2025 132 (H)  44 - 121 IU/L Final    Liver Fraction: 03/20/2025 82  18 - 85 % Final    Bone Fraction: 03/20/2025 18  14 - 68 % Final    Intestinal Frac.: 03/20/2025 0  0 - 18 % Final    Glucose 03/20/2025 86  65 - 99 mg/dL Final    BUN 03/20/2025 17  6 - 20 mg/dL Final    Creatinine 03/20/2025 0.76  0.57 - 1.00 mg/dL Final    Sodium 03/20/2025 140  136 - 145 mmol/L Final    Potassium 03/20/2025 4.0  3.5 - 5.2 mmol/L Final    Chloride 03/20/2025 104  98 - 107 mmol/L Final    CO2 03/20/2025 23.8  22.0 - 29.0 mmol/L Final    Calcium 03/20/2025 10.1  8.6 - 10.5 mg/dL Final    Albumin 03/20/2025 4.2  3.5 - 5.2 g/dL Final    Phosphorus 03/20/2025 3.0  2.5 - 4.5 mg/dL Final    Anion Gap 03/20/2025 12.2  5.0 - 15.0 mmol/L Final    BUN/Creatinine Ratio 03/20/2025 22.4  7.0 - 25.0 Final    eGFR 03/20/2025 113.1  >60.0 mL/min/1.73 Final    GGT 03/20/2025 24  5 - 36 U/L Final    KIM Direct 03/20/2025 Negative  Negative Final    CCP Antibodies IgG/IgA 03/20/2025 5  0 - 19 units Final                              Negative               <20                            Weak positive      20 - 39                            Moderate positive  40 - 59                            Strong positive        >59    Creatine Kinase 03/20/2025 42  20 - 180 U/L Final    C-Reactive Protein 03/20/2025 2.57 (H)  0.00 - 0.50 mg/dL Final    Rheumatoid Factor Quantitative 03/20/2025 <10.0  0.0 - 14.0 IU/mL Final    Sed Rate 03/20/2025 30 (H)  0 - 20 mm/hr Final    HLA B27 03/20/2025 Negative   Final    HLA-B*27 Negative  B27 allele interpretation for all loci based on IMGT/HLA  database version 3.51.0  This test was developed and its performance characteristics  determined by [facility].  It has not been cleared or approved  by the Food and Drug Administration.  The FDA has  determined that such clearance or approval is  not necessary.  [facility] CLIA ID Number [id number]  This test was performed using Polymerase Chain Reaction (PCR) and  Sequence Specific Oligonucleotide Probes (SSOP) technique. Sequence  Based Typing (SBT) may be used as a supplemental method when  necessary.  If you have questions, please call [facility] customer service at  [phone] or email at [email or web address].       Results        ASSESSMENT/ PLAN:    There are no diagnoses linked to this encounter.    Assessment & Plan      Orders Placed Today:     No orders of the defined types were placed in this encounter.       Management Plan:     An After Visit Summary was printed and given to the patient at discharge.    Follow-up: No follow-ups on file.    Patient or patient representative verbalized consent for the use of Ambient Listening during the visit with  MARIJA Valdez for chart documentation. 6/5/2025  08:06 EDT    MARIJA Valdez 6/5/2025 08:04 EDT  This note was electronically signed.

## 2025-06-13 ENCOUNTER — OFFICE VISIT (OUTPATIENT)
Dept: FAMILY MEDICINE CLINIC | Facility: CLINIC | Age: 25
End: 2025-06-13
Payer: COMMERCIAL

## 2025-06-13 ENCOUNTER — TREATMENT (OUTPATIENT)
Dept: PHYSICAL THERAPY | Facility: CLINIC | Age: 25
End: 2025-06-13
Payer: COMMERCIAL

## 2025-06-13 VITALS
TEMPERATURE: 98.7 F | WEIGHT: 201 LBS | DIASTOLIC BLOOD PRESSURE: 81 MMHG | BODY MASS INDEX: 42.02 KG/M2 | HEART RATE: 85 BPM | RESPIRATION RATE: 20 BRPM | SYSTOLIC BLOOD PRESSURE: 133 MMHG

## 2025-06-13 DIAGNOSIS — M62.81 MUSCLE WEAKNESS OF PROXIMAL EXTREMITY: ICD-10-CM

## 2025-06-13 DIAGNOSIS — N94.9 GENITAL LESION, FEMALE: ICD-10-CM

## 2025-06-13 DIAGNOSIS — M25.562 LEFT ANTERIOR KNEE PAIN: Primary | ICD-10-CM

## 2025-06-13 DIAGNOSIS — Z01.419 WELL WOMAN EXAM WITH ROUTINE GYNECOLOGICAL EXAM: Primary | ICD-10-CM

## 2025-06-13 LAB
C TRACH DNA SPEC QL NAA+PROBE: NOT DETECTED
N GONORRHOEA RRNA SPEC QL NAA+PROBE: NOT DETECTED

## 2025-06-13 PROCEDURE — G0123 SCREEN CERV/VAG THIN LAYER: HCPCS

## 2025-06-13 PROCEDURE — 87591 N.GONORRHOEAE DNA AMP PROB: CPT

## 2025-06-13 PROCEDURE — 87624 HPV HI-RISK TYP POOLED RSLT: CPT

## 2025-06-13 PROCEDURE — 87491 CHLMYD TRACH DNA AMP PROBE: CPT

## 2025-06-13 NOTE — PROGRESS NOTES
Well Woman Visit    CC: Scheduled annual well gyn visit  Chief Complaint   Patient presents with    Annual Exam     -pt has a bump  outside of vagina          HPI:   24 y.o. who presents today for annual exam. Patient states periods are regular, intermittently heavy, on heaviest days changes products three times a day, and are not painful. She is sexually active. She denies any H/O STDS.  She denies any pain with intercourse. She denies any family H/O breast, uterine or ovarian cancer. She denies any vaginal odor, vaginal discharge, dysuria/hematuria, F/C, D/C, N/V, CP or SOB. She denies any difficulties with urination or incontinence issues.    History: PMHx, Meds, Allergies, PSHx, Social Hx, and POBHx all reviewed and updated.    ROS:  General ROS: negative for chills or fatigue  Ophthalmic ROS: negative for blurry vision or decreased vision  ENT ROS: negative for epistaxis, headaches or hearing change  Hematological and Lymphatic ROS: negative for bleeding problems or blood clots  Endocrine ROS: negative for breast changes or galactorrhea  Breast ROS: negative for galactorrhea or new or changing breast lumps  Respiratory ROS: negative for cough or hemoptysis  Cardiovascular ROS: negative for chest pain or dyspnea on exertion  Gastrointestinal ROS: negative for abdominal pain or appetite loss  Genito-Urinary ROS: negative for difficulty in urination or vaginal irritation   Musculoskeletal ROS: negative for gait disturbance or joint pain  Neurological ROS: negative for behavioral changes or bowel and bladder control changes  Dermatological ROS: negative for rash  Psych ROS: negative for depression      PHYSICAL EXAM:      /81   Pulse 85   Temp 98.7 °F (37.1 °C) (Temporal)   Resp 20   Wt 91.2 kg (201 lb)   LMP 2025   BMI 42.02 kg/m²  Not found.    General- NAD, alert and oriented, appropriate  Psych- Normal mood, good memory  Neck- No masses, no thyroid enlargement  CV- Regular rhythm, no  murnurs  Resp- CTA to bases, no wheezes  Abdomen- Soft, non distended, non tender, no masses    Breast Exam:  Breast self awareness counseled  Breast left-  Bilaterally symmetrical, no masses, non tender, no nipple discharge  Breast right- Bilaterally symmetrical, no masses, non tender, no nipple discharge    Pelvic Exam:   External genitalia- Normal female, small lesion on left sided of labia minora.   Urethra/meatus- Normal, no masses, non tender  Bladder- Normal, no masses, non tender  Vagina- Normal, no atrophy, no lesions, discharge noted.  Prolapse : none noted   Cvx- Normal, no lesions, no discharge, No cervical motion tenderness  Uterus- Normal size, shape & consistency.  Non tender, mobile.  Adnexa- No mass, non tender  Anus/Rectum/Perineum- Not performed    Lymphatic- No palpable neck, axillary, or groin nodes  Ext- No edema, no cyanosis    Skin- No lesions, no rashes, no acanthosis nigricans      ASSESSMENT and PLAN:    Diagnoses and all orders for this visit:    1. Well woman exam with routine gynecological exam (Primary)  -     IgP, Aptima HPV  -     Cancel: Chlamydia trachomatis, Neisseria gonorrhoeae, PCR - Urine, Urine, Clean Catch  -     HSV 1 & 2 - Specific Antibody, IgG  -     Chlamydia trachomatis, Neisseria gonorrhoeae, PCR - Swab, Cervix    2. Genital lesion, female  -     Cancel: HSV 1 & 2 - Specific Antibody, IgG  -     HSV 1 & 2 - Specific Antibody, IgG  -     Chlamydia trachomatis, Neisseria gonorrhoeae, PCR - Swab, Cervix        Preventative:  1. Annuals every year; Cytology collections per prevailing guidelines.   2. Mammograms begin every year at 41 yo if no abnormalities are found and no family history.  3. Bone density studies begin at 66 yo. If no fracture history or osteoporosis family history.  4. Colonoscopy begins at 46 yo. Repeat every ten years if negative and no family history.      She understands the importance of having any ordered tests to be performed in a timely fashion.   The risks of not performing them include, but are not limited to, advanced cancer stages, bone loss from osteoporosis and/or subsequent increase in morbidity and/or mortality.  She is encouraged to review her results online and/or contact or office if she has questions.     Follow Up:  Return in about 8 months (around 2/13/2026).        Bisi Navarro, APRN

## 2025-06-13 NOTE — PROGRESS NOTES
Physical Therapy Initial Evaluation and Plan of Care      [facility]: [address] [place] [address]      Patient: Lesly Bonilla   : 2000  Diagnosis/ICD-10 Code:  Left anterior knee pain [M25.562]  Referring practitioner: MARIJA Valdez  Date of Initial Visit: 2025  Encounter Date:  2025  Patient seen for 1 sessions           Subjective Questionnaire: LEFS: 58/80      Subjective   Lesly Bonilla reports to physical therapy w/ complaints of L knee pain. This pain has been present for about  years. Pt reports they do not have pain all of the time. The pain increases w/ increased activity or twisting certain ways. Pt reports at work they can have pain w/ walking, stairs, and step ladders. Pt reports the pain is anterior. This can be both medial, lateral, and at the patella. Pt does experience some popping if they are standing w/ their knee extended for a period and go to bend it. Pt reports their current level of pain is a 0/10. Pt reports pain can have pain up to a 6/10. Pt has used rest and ibuprofen to make it feel better. Pt denies N&T. Pt denies previous injury to knee.       Pt occupation: [employer] (employee for a little over years)    Past Medical Hx:   Past Medical History:   Diagnosis Date    Headache     Plz find out why       Past Surgical History:   Procedure Laterality Date    WISDOM TOOTH EXTRACTION           Objective          Active Range of Motion   Left Knee   Flexion: 130 degrees     Right Knee   Flexion: 130 degrees     Additional Active Range of Motion Details  Some ache w/ full knee flexion on L    Strength/Myotome Testing     Left Hip   Planes of Motion   Flexion: 5  Abduction: 4+    Right Hip   Planes of Motion   Flexion: 5  Abduction: 4+    Left Knee   Flexion: 5  Extension: 5  Quadriceps contraction: good    Right Knee   Flexion: 5  Extension: 5  Quadriceps contraction: good    Functional Assessment   Squat   Left tibial anterior translation  beyond toes and right tibial anterior translation beyond toes.     Single Leg Stance - Eyes Open   Left  Trial 1: 30 seconds    Right  Trial 1: 30 seconds      See Exercise, Manual, and Modality Logs for complete treatment.       Assessment & Plan       Assessment  Impairments: abnormal muscle firing, abnormal or restricted ROM, activity intolerance, impaired balance, impaired physical strength, lacks appropriate home exercise program and pain with function   Functional limitations: carrying objects, lifting, walking, pulling, pushing, uncomfortable because of pain, sitting, standing, stooping and unable to perform repetitive tasks   Assessment details: Pt reports to therapy w/ complaints of chronic L knee pain. Pt presents w/ decreased strength, squat abnormalities, and pain. Pt symptoms are consistent w/ patellar tendonitis. Pt has increased perceived deficits described by LEFS. Pt has been educated on their HEP as well as encouraged increased levels of activity when outside of work. Pt has currently been limited in performing stairs, ladders, and walking due to increased L knee pain. Pt will benefit from skilled physical therapy, to address their current impairments and limitations, in order to improve upon performance of ambulatory tasks and work tasks safely and without restrictions.       Prognosis: good    Goals  Plan Goals: KNEE PROBLEMS:     1. The patient demonstrates weakness of the B hip.   LTG 1: 12 weeks:  The patient will demonstrate 5/5 strength for B hip flexion, abduction, and extension in order to improve frontal plane knee stability.    STATUS:  New   TREATMENT: Therapeutic exercises, manual therapy, aquatic therapy, home exercise instruction,and modalities as needed for pain to include:  electrical stimulation, moist heat, ice, and ultrasound    2. Mobility: Walking/Moving Around Functional Limitation     LTG 2: 12 weeks:  The patient will demonstrate 0 % limitation by achieving a score of 80/80  on the LEFS.    STATUS:  New   STG 2a: 6 weeks:  The patient will demonstrate 6.25 % limitation by achieving a score of 75/80 on the LEFS.      STATUS:  New   TREATMENT:  Manual therapy, therapeutic exercise, home exercise instruction, and modalities as needed to include: moist heat, electrical stimulation, and ultrasound.     3. The patient complains of L knee pain.   LTG 3: 12 weeks:  The patient will report a pain rating of 0/10 or better in order to improve tolerance to activities of daily living.     STATUS:  New   STG 3a: 6 weeks:  The patient will report a pain rating of 2/10 or better.    STATUS:  New   TREATMENT:  Therapeutic exercises, manual therapy, aquatic therapy, home exercise instruction, and modalities as needed for pain to include:  electrical stimulation, moist heat, ice, ultrasound, and diathermy.               PLAN:  Therapy options: will receive skilled therapy services  Planned modality interventions: Cryotherapy and Heat  Planned therapy interventions:balance/weight-bearing training, ADL retraining, soft tissue mobilization, strengthening, stretching, therapeutic activities, manual therapy, joint mobilization, home exercise program/patient education, gait training, functional ROM exercises, flexibility, body mechanics training, postural training, and neuromuscular re-education  Frequency: 2x per week  Duration in weeks: 12  Treatment plan discussed with: patient      Visit Diagnoses:    ICD-10-CM ICD-9-CM   1. Left anterior knee pain  M25.562 719.46   2. Muscle weakness of proximal extremity  M62.81 728.87       History # of Personal Factors and/or Comorbidities: LOW (0)  Examination of Body System(s): # of elements: LOW (1-2)  Clinical Presentation: STABLE   Clinical Decision Making: LOW       Timed:         Manual Therapy:    0     mins  31982;     Therapeutic Exercise:    25     mins  49109;     Neuromuscular Adelaide:    0    mins  10181;    Therapeutic Activity:     0     mins  48435;      Gait Trainin     mins  46259;     Ultrasound:     0     mins  04337;    Ionto                               0    mins   64346  Self Care                       0     mins   79533  Canalith Repos    0     mins 38594      Un-Timed:  Electrical Stimulation:    0     mins  83893 ( );  Dry Needling     0     mins self-pay  Traction     0     mins 52397  Low Eval     15     Mins  31109  Mod Eval     0     Mins  71447  High Eval                       0     Mins  82779  Re-Eval                           0    mins  10630    Timed Treatment:   25   mins   Total Treatment:     40   mins    PT SIGNATURE: Sarbjit Kaur PT, DPT    Electronically signed  2025    [place] License: PT - [id number]    Initial Certification  Certification Period: 2025 thru 9/10/2025  I certify that the therapy services are furnished while this patient is under my care.  The services outlined above are required by this patient, and will be reviewed every 90 days.     PHYSICIAN: Bisi Navarro APRN  NPI: [id number]      DATE:     Please sign and return via fax to [phone]. Thank you, [facility].

## 2025-06-17 LAB
CYTOLOGIST CVX/VAG CYTO: NORMAL
CYTOLOGY CVX/VAG DOC CYTO: NORMAL
CYTOLOGY CVX/VAG DOC THIN PREP: NORMAL
DX ICD CODE: NORMAL
HPV I/H RISK 4 DNA CVX QL PROBE+SIG AMP: NEGATIVE
OTHER STN SPEC: NORMAL
SERVICE CMNT-IMP: NORMAL
STAT OF ADQ CVX/VAG CYTO-IMP: NORMAL

## 2025-06-20 ENCOUNTER — TREATMENT (OUTPATIENT)
Dept: PHYSICAL THERAPY | Facility: CLINIC | Age: 25
End: 2025-06-20
Payer: COMMERCIAL

## 2025-06-20 DIAGNOSIS — M25.562 LEFT ANTERIOR KNEE PAIN: Primary | ICD-10-CM

## 2025-06-20 DIAGNOSIS — M62.81 MUSCLE WEAKNESS OF PROXIMAL EXTREMITY: ICD-10-CM

## 2025-06-20 PROCEDURE — 97110 THERAPEUTIC EXERCISES: CPT

## 2025-06-20 PROCEDURE — 97530 THERAPEUTIC ACTIVITIES: CPT

## 2025-06-20 NOTE — PROGRESS NOTES
Outpatient Physical Therapy                   Physical Therapy Daily Treatment Note    Patient: Lesly Bonilla   : 2000  Diagnosis/ICD-10 Code:  Left anterior knee pain [M25.562]  Referring practitioner: MARIJA Valdez  Date of Initial Visit: Type: THERAPY  Noted: 2025  Today's Date: 2025  Patient seen for 2 sessions             Subjective   Lesly Bonilla reports: work has been aggravating her pain; she has left early some days so her pain doesn't get too high. 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 She could tell she did something new. Pt states she is only doing her HEP if she isn't hurting too bad after work.     Pain:   0/10 pain, at time of arrival   Worst pain in the last week: 5/10 pain      Objective     See Exercise, Manual, and Modality Logs for complete treatment.     Assessment/Plan  Lesly is just beginning care to attend to deficits outlined in evaluation, requiring further therapist directed strengthening. Assess how patient tolerated treatment next session.      Progress per Plan of Care      Timed:  Manual Therapy:    0     mins  94973;  Therapeutic Exercise:    16     mins  83494;  Neuromuscular Adelaide:    0    mins  69456;  Therapeutic Activity:     10     mins  65205;  Self care:                       0     mins  26461;  Gait Trainin     mins  22953;  Ultrasound:                    0     mins  66488;    Untimed:  Mechanical Traction:    0     mins  04934;  Electrical Stimulation:    0     mins  61507 ( );      Timed Treatment:   26   mins  Total Treatment:     26   mins      Electronically signed:   [place]oda [place] PTA  Physical Therapist Assistant  [facility] License #: [id number]

## 2025-07-10 ENCOUNTER — TREATMENT (OUTPATIENT)
Dept: PHYSICAL THERAPY | Facility: CLINIC | Age: 25
End: 2025-07-10
Payer: COMMERCIAL

## 2025-07-10 ENCOUNTER — HOSPITAL ENCOUNTER (OUTPATIENT)
Dept: MRI IMAGING | Facility: HOSPITAL | Age: 25
Discharge: HOME | End: 2025-07-10
Payer: COMMERCIAL

## 2025-07-10 ENCOUNTER — LAB (OUTPATIENT)
Dept: LAB | Facility: HOSPITAL | Age: 25
End: 2025-07-10
Payer: COMMERCIAL

## 2025-07-10 DIAGNOSIS — G89.29 CHRONIC PAIN OF LEFT KNEE: ICD-10-CM

## 2025-07-10 DIAGNOSIS — M25.562 CHRONIC PAIN OF LEFT KNEE: ICD-10-CM

## 2025-07-10 DIAGNOSIS — M25.562 LEFT ANTERIOR KNEE PAIN: Primary | ICD-10-CM

## 2025-07-10 DIAGNOSIS — M62.81 MUSCLE WEAKNESS OF PROXIMAL EXTREMITY: ICD-10-CM

## 2025-07-10 LAB — 25(OH)D3 SERPL-MCNC: 45.8 NG/ML (ref 30–100)

## 2025-07-10 PROCEDURE — 82306 VITAMIN D 25 HYDROXY: CPT

## 2025-07-10 PROCEDURE — 73721 MRI JNT OF LWR EXTRE W/O DYE: CPT

## 2025-07-10 PROCEDURE — 86696 HERPES SIMPLEX TYPE 2 TEST: CPT

## 2025-07-10 PROCEDURE — 86695 HERPES SIMPLEX TYPE 1 TEST: CPT

## 2025-07-10 NOTE — PROGRESS NOTES
Physical Therapy Daily Treatment Note      Patient: Lesly Bonilla   : 2000  Referring practitioner: MARIJA Valdez  Date of Initial Visit: Type: THERAPY  Noted: 2025  Today's Date: 7/10/2025  Patient seen for 3 sessions           Subjective Questionnaire:       Subjective Evaluation    History of Present Illness    Subjective comment: Pt reports no pain currently.  Pt reports L knee pain with standing, walking, stairs and ladders.  Pt reports she has been on vacation so hasn't had much L knee pain.       Objective   See Exercise, Manual, and Modality Logs for complete treatment.       Assessment & Plan       Assessment  Assessment details: Pt tolerated strengthening well with no report of increased pain or discomfort.  Added TKE on Cybex today and pt tolerated that well.  Pt has not been at work where she stands and walks a lot so her L knee pain was down.  Continue with POC.        Visit Diagnoses:    ICD-10-CM ICD-9-CM   1. Left anterior knee pain  M25.562 719.46   2. Muscle weakness of proximal extremity  M62.81 728.87       Progress per Plan of Care and Progress strengthening /stabilization /functional activity           Timed:  Manual Therapy:         mins  31901;  Therapeutic Exercise:    17     mins  77400;     Neuromuscular Adelaide:        mins  60054;    Therapeutic Activity:     8     mins  31267;     Gait Training:           mins  02778;     Ultrasound:          mins  69785;    Electrical Stimulation:         mins  48733 ( );  Aquatic Therapy          mins  88655    Untimed:  Electrical Stimulation:         mins  22140 ( );  Mechanical Traction:         mins  96752;     Timed Treatment:   25   mins   Total Treatment:     25    mins    Electronically signed    Norma Vasquez PTA  Physical Therapist Assistant    [facility] license: [id number]

## 2025-07-11 ENCOUNTER — RESULTS FOLLOW-UP (OUTPATIENT)
Dept: FAMILY MEDICINE CLINIC | Facility: CLINIC | Age: 25
End: 2025-07-11
Payer: COMMERCIAL

## 2025-07-11 NOTE — TELEPHONE ENCOUNTER
Patient informed and voiced understanding of radiology results. Patient states she is already doing PT and is agreeable to a course of NSAIDs.     Possible Patellar tendon/lateral femoral condyle friction syndrome. Treatment for this is generally physical therapy and short course of NSAIDs if no improvement can refer to orthopedics.   MRI Knee Left Without Contrast

## 2025-07-12 LAB
HSV1 IGG SERPL QL IA: REACTIVE
HSV2 IGG SERPL QL IA: NON REACTIVE

## 2025-07-24 ENCOUNTER — TREATMENT (OUTPATIENT)
Dept: PHYSICAL THERAPY | Facility: CLINIC | Age: 25
End: 2025-07-24
Payer: COMMERCIAL

## 2025-07-24 DIAGNOSIS — M62.81 MUSCLE WEAKNESS OF PROXIMAL EXTREMITY: ICD-10-CM

## 2025-07-24 DIAGNOSIS — M25.562 LEFT ANTERIOR KNEE PAIN: Primary | ICD-10-CM

## 2025-07-24 NOTE — PROGRESS NOTES
Re-evaluation  [facility] [address] [place] [address]      Patient: Lesly Bonilla   : 2000  Diagnosis/ICD-10 Code:  Left anterior knee pain [M25.562]  Referring practitioner: MARIJA Valdez  Date of Initial Visit: Type: THERAPY  Noted: 2025  Today's Date: 2025  Patient seen for 4 sessions      Subjective:   Subjective Questionnaire: LEFS: 70/80 = 87.5% Function  Clinical Progress: unchanged  Home Program Compliance: Yes  Treatment has included: therapeutic exercise, neuromuscular re-education, manual therapy, and therapeutic activity    Subjective     History of Present Illness  The patient is a 24-year-old female who presents for left knee pain.    She reports that her left knee has been generally good, but she experienced pain during work this week. The pain is primarily located at the front of her knee. Her job at [employer] involves extensive walking, averaging about 10,000 steps per day, with some days reaching up to 20,000 steps. She also climbs up to 32 flights of stairs in a day. Despite starting exercises last month, she has not noticed any improvement in her condition. She was referred for physical therapy by Bisi Navarro but has not yet returned for a follow-up visit. She has been consistent with her home exercises. She attempted to use a knee brace but found it uncomfortable due to its tendency to slip down. She has tried KT tape, which provides some relief if applied before the onset of pain. She does not typically experience significant pain early in the morning. Initially, the pain radiated down her leg, but this has not been the case recently.    Pain Assessment:  - Pain level: 1/10  - Laterality and location: Left knee, primarily at the front  - Pain frequency: During work  - Aggravating factors: Work, steps, walking all day    Functional Status/Activity Limitations:  - Extensive walking at work, averaging about 10,000 steps per day, up to 20,000 steps  on some days  - Climbing up to 32 flights of stairs in a day  - Attempted use of a knee brace, found uncomfortable  - Use of KT tape provides some relief if applied before the onset of pain    SOCIAL HISTORY  Type of Occupation: Works at [employer].  Person(s) Patient Resides with: Resides with mother.       Patient or patient representative verbalized consent for the use of Ambient Listening during the visit with  Dylan Sykes PT for chart documentation. 7/24/2025  11:54 EDT    Objective   Active Range of Motion   Left Knee   Flexion: 130 degrees      Right Knee   Flexion: 130 degrees       Strength/Myotome Testing      Left Hip   Planes of Motion   Flexion: 5  Abduction: 5     Right Hip   Planes of Motion   Flexion: 5  Abduction: 5     Left Knee   Flexion: 5  Extension: 5  Quadriceps contraction: good     Right Knee   Flexion: 5  Extension: 5  Quadriceps contraction: good     Functional Assessment   Squat   Left tibial anterior translation beyond toes and right tibial anterior translation beyond toes.      Single Leg Stance - Eyes Open   Left  Trial 1: 30 seconds     Right  Trial 1: 30 seconds     See Exercise, Manual, and Modality Logs for complete treatment.       Assessment/Plan  Progress toward previous goals: Partially Met    Assessment & Plan  1. Left knee pain.  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 This condition is likely exacerbated by repetitive knee bending and extending during her daily activities, particularly at work where she averages 10,000-20,000 steps and climbs numerous flights of stairs. Despite consistent adherence to home exercises, her symptoms have remained stable over the past month. Strength and range of motion are within normal limits, yet functional mobility is impaired. Soft tissue scraping was performed today to target the patellar tendon and quadriceps  tendon, aiming to alleviate irritation and improve tissue lengthening. She was instructed to continue stretching and scraping techniques at home. If symptoms persist or worsen, she is advised to contact the clinic for further evaluation. We will place her on hold at this time.    Education: The patient was educated on the findings of her MRI and how the inflammation and friction in the knee contribute to her pain. She was informed about the importance of maintaining a regular stretching routine and using scraping techniques to manage symptoms. The expected healing timeline and the significance of adherence to the home exercise program were discussed.    Goals:  - The patient will perform stretching and scraping techniques at home to manage knee pain and improve functional mobility.  - The patient will monitor her symptoms and contact the clinic if further intervention is needed.     Goals  Plan Goals: KNEE PROBLEMS:      1. The patient demonstrates weakness of the B hip.              LTG 1: 12 weeks:  The patient will demonstrate 5/5 strength for B hip flexion, abduction, and extension in order to improve frontal plane knee stability.                          STATUS:  Met              TREATMENT: Therapeutic exercises, manual therapy, aquatic therapy, home exercise instruction,and modalities as needed for pain to include:  electrical stimulation, moist heat, ice, and ultrasound     2. Mobility: Walking/Moving Around Functional Limitation                               LTG 2: 12 weeks:  The patient will demonstrate 0 % limitation by achieving a score of 80/80 on the LEFS.                          STATUS:  Progressing              STG 2a: 6 weeks:  The patient will demonstrate 6.25 % limitation by achieving a score of 75/80 on the LEFS.                            STATUS:  Progressing              TREATMENT:  Manual therapy, therapeutic exercise, home exercise instruction, and modalities as needed to include: moist heat,  electrical stimulation, and ultrasound.      3. The patient complains of L knee pain.              LTG 3: 12 weeks:  The patient will report a pain rating of 0/10 or better in order to improve tolerance to activities of daily living.                           STATUS:  Progressing              STG 3a: 6 weeks:  The patient will report a pain rating of 2/10 or better.                          STATUS:  Met              TREATMENT:  Therapeutic exercises, manual therapy, aquatic therapy, home exercise instruction, and modalities as needed for pain to include:  electrical stimulation, moist heat, ice, ultrasound, and diathermy.      Recommendations: Place on hold from PT.    Prognosis to achieve goals: fair    PT Signature: Dylan Sykes PT    Electronically signed 2025    [place] License: PT - [id number]       Timed:  Manual Therapy:    8     mins  47076;  Therapeutic Exercise:    12     mins  [id number];     Neuromuscular [place]:    0    mins  75934;    Therapeutic Activity:     0     mins  [id number];     Gait Trainin     mins  [id number];     Aquatics                         0      mins  [id number]    Un-timed:  Mechanical Traction      0     mins  [id number]  Dry Needling     0     mins self-pay  Electrical Stimulation:    0     mins  [id number] ( )  Re-evaluation:               10     mins [id number];    Timed Treatment:   20   mins   Total Treatment:     30   mins

## 2025-07-25 DIAGNOSIS — G89.29 CHRONIC PAIN OF LEFT KNEE: Primary | ICD-10-CM

## 2025-07-25 DIAGNOSIS — M25.562 CHRONIC PAIN OF LEFT KNEE: Primary | ICD-10-CM
